# Patient Record
Sex: MALE | Race: WHITE | HISPANIC OR LATINO | Employment: OTHER | URBAN - METROPOLITAN AREA
[De-identification: names, ages, dates, MRNs, and addresses within clinical notes are randomized per-mention and may not be internally consistent; named-entity substitution may affect disease eponyms.]

---

## 2017-04-01 ENCOUNTER — HOSPITAL ENCOUNTER (EMERGENCY)
Facility: HOSPITAL | Age: 32
Discharge: HOME | End: 2017-04-01
Attending: EMERGENCY MEDICINE

## 2017-04-01 VITALS
OXYGEN SATURATION: 97 % | HEIGHT: 69 IN | DIASTOLIC BLOOD PRESSURE: 82 MMHG | SYSTOLIC BLOOD PRESSURE: 144 MMHG | BODY MASS INDEX: 37.18 KG/M2 | RESPIRATION RATE: 18 BRPM | WEIGHT: 251 LBS | HEART RATE: 85 BPM | TEMPERATURE: 98 F

## 2017-04-01 DIAGNOSIS — R10.9 FLANK PAIN: ICD-10-CM

## 2017-04-01 DIAGNOSIS — R31.9 HEMATURIA: ICD-10-CM

## 2017-04-01 DIAGNOSIS — N20.0 NEPHROLITHIASIS: Primary | ICD-10-CM

## 2017-04-01 LAB
BILIRUB UR QL STRIP: NEGATIVE
CLARITY UR: CLEAR
COLOR UR: YELLOW
GLUCOSE UR QL STRIP: NEGATIVE
HGB UR QL STRIP: ABNORMAL
KETONES UR QL STRIP: NEGATIVE
LEUKOCYTE ESTERASE UR QL STRIP: NEGATIVE
MICROSCOPIC COMMENT: ABNORMAL
NITRITE UR QL STRIP: NEGATIVE
PH UR STRIP: 6 [PH] (ref 5–8)
PROT UR QL STRIP: ABNORMAL
RBC #/AREA URNS HPF: 19 /HPF (ref 0–4)
SP GR UR STRIP: 1.02 (ref 1–1.03)
URN SPEC COLLECT METH UR: ABNORMAL
UROBILINOGEN UR STRIP-ACNC: NEGATIVE EU/DL

## 2017-04-01 PROCEDURE — 81000 URINALYSIS NONAUTO W/SCOPE: CPT

## 2017-04-01 PROCEDURE — 99284 EMERGENCY DEPT VISIT MOD MDM: CPT

## 2017-04-01 RX ORDER — TAMSULOSIN HYDROCHLORIDE 0.4 MG/1
0.4 CAPSULE ORAL DAILY
Qty: 30 CAPSULE | Refills: 0 | Status: SHIPPED | OUTPATIENT
Start: 2017-04-01 | End: 2018-04-01

## 2017-04-01 RX ORDER — NAPROXEN 375 MG/1
375 TABLET ORAL 2 TIMES DAILY WITH MEALS
Qty: 30 TABLET | Refills: 0 | Status: SHIPPED | OUTPATIENT
Start: 2017-04-01

## 2017-04-01 NOTE — DISCHARGE INSTRUCTIONS
"  Cálculo Renal [Kidney Stone W/ Colic]    Katy daya retorcijón (cólico [cramping]) que usted siente, así jim las náuseas (nausea) y el vómito (vomiting) que tiene se deben a rah pequeña gracie (cálculo [stone]) que se le formó en el riñón y que ahora está pasando por un conducto angosto (el uréter), jemima hacia la vejiga. Rah vez que llegue a la vejiga, el dolor se detendrá. Es posible que el cálculo (gracie [stone]) pase entero por el tracto urinario. [El tamaño puede ser de entre 1/16" y 1/4" (1 y 6 mm)]. También puede ser que la gracie se desgrane en fragmentos arenosos de los que usted quizás ni siquiera se dé cuenta.  Cuando se ha tenido un cálculo renal (kidney stone), hay probabilidades de que otro vuelva a formarse en el futuro.  Cuidados En [place]:  1. Maty gran cantidad de agua (al menos, entre 8 y 10 vasos al día).  2. La mayoría de los cálculos (piedras [stones]) salen solas, rosa pueden tardar desde algunas horas a algunos días en hacerlo. Hay veces en que la gracie es demasiado jackson para salir linda y, entonces, hay que recurrir a métodos especiales para quitarla.  3. Cada vez que orine, hágalo en un recipiente (jarra o algo similar). Vierta la orina de katy recipiente en el inodoro pasándola por un colador. Hágalo así hasta que hayan pasado 24 horas después de que haya cesado el dolor. Para entonces, si había un cálculo renal (gracie en el riñón [kidney stone]), ya debería briana salido de la vejiga. Algunas piedras se disuelven en partículas que parecen arena y atraviesan el colador sin que usted lo note. Si eso sucede, no verá ninguna gracie.  4. Si encuentra rah gracie en el colador, guárdela y llévesela al médico para que la analice. Hay algunos tipos de gracie cuya formación se puede prevenir. Por lo tanto, es importante saber qué tipo de gracie es la que usted tiene.  5. Intente mantenerse lo más activo posible, dado que eso ayudará a expulsar la gracie. No se quede en la cama a menos que el " dolor le impida estar levantado. Quizás note que la orina es de color rojiza, rosada o amarronada. Es normal cuando se está despidiendo un cálculo renal (kidney stone).  Programe rah VISITA DE CONTROL con gustafson médico o regrese a solis centro si el dolor dura más de 48 horas.  [NOTA: Si le buckley hecho rah radiografía (X-ray) o rah tomografía computarizada (CT scan), éstas serán evaluadas por un especialista. Le informarán de los nuevos hallazgos que puedan afectar la atención médica que necesita.]  Busque Prontamente Atención Médica  si algo de lo siguiente ocurre:  · El dolor no disminuye con el medicamento que le dieron.  · Vómito persistente o no puede mantener los líquidos en el estómago.  · Debilidad, mareo o desmayo.  · Fiebre de 100.4°F (38°C) o más ernst, o jim le haya indicado gustafson proveedor de atención médica.  · Orina opaca de color rojizo o amarronada (no se puede negrita a través de jenny) u orina que tiene muchos coágulos de bette (blood clots).  · No ha podido orinar en 8 horas y siente rah presión en la vejiga que es cada vez mayor.     Date Last Reviewed: 1/10/2014  © 2434-5208 [employer]. [address] [place] [id number]. Todos los derechos reservados. Esta información no pretende sustituir la atención médica profesional. Sólo gustafson médico puede diagnosticar y tratar un problema de kenyetta.

## 2017-04-01 NOTE — ED PROVIDER NOTES
SCRIBE #1 NOTE: I, Nicole Pickard, am scribing for, and in the presence of, Asael Marinao MD. I have scribed the entire note.      History      Chief Complaint   Patient presents with    Abdominal Pain     patient abdominal pain for 2 day and nausea in the morning.        Review of patient's allergies indicates:  No Known Allergies     HPI   HPI    4/1/2017, 1:16 PM   History obtained from the patient      History of Present Illness: Tin Mckeon is a 32 y.o. male patient who presents to the Emergency Department for abdominal pain which onset gradually 2 days ago. Symptoms are constant and moderate in severity. Pain is located to LLQ. No mitigating or exacerbating factors reported. Associated sxs include dysuria. Patient denies any hematuria, difficulty urinating, fever, HA n/v/d, constipation, flank pain, back pain, CP, SOB, and all other sxs at this time. HPI is limited due to pt speaking little english. No further complaints or concerns at this time.         Arrival mode: Personal vehicle    PCP: No primary care provider on file.       Past Medical History:  Past medical history reviewed not relevant      Past Surgical History:  Past surgical history reviewed not relevant      Family History:  Family history reviewed not relevant      Social History:  Unknown    Social History Main Topics    Social History Main Topics    Smoking status: Unknown if ever smoked    Smokeless tobacco: Unknown if ever used    Alcohol Use: Unknown drinking history    Drug Use: Unknown if ever used    Sexual Activity: Unknown         ROS   Review of Systems   Constitutional: Negative for fever.   HENT: Negative for congestion and sore throat.    Respiratory: Negative for shortness of breath.    Cardiovascular: Negative for chest pain.   Gastrointestinal: Positive for abdominal pain (LLQ). Negative for abdominal distention, blood in stool, constipation, diarrhea, nausea and vomiting.   Genitourinary: Positive for dysuria. Negative  "for decreased urine volume, difficulty urinating, flank pain and hematuria.   Musculoskeletal: Negative for back pain.   Skin: Negative for rash.   Neurological: Negative for weakness and headaches.   Hematological: Does not bruise/bleed easily.       Physical Exam    Initial Vitals   BP Pulse Resp Temp SpO2   04/01/17 1313 04/01/17 1313 04/01/17 1313 04/01/17 1313 04/01/17 1313   144/82 85 18 97.6 °F (36.4 °C) 97 %      Physical Exam  Nursing Notes and Vital Signs Reviewed.  Constitutional: Patient is in no acute distress. Awake and alert. Well-developed and well-nourished.  Head: Atraumatic. Normocephalic.  Eyes: PERRL. EOM intact. Conjunctivae are not pale. No scleral icterus.  ENT: Mucous membranes are moist. Oropharynx is clear and symmetric.    Neck: Supple. Full ROM. No lymphadenopathy.  Cardiovascular: Regular rate. Regular rhythm. No murmurs, rubs, or gallops. Distal pulses are 2+ and symmetric.  Pulmonary/Chest: No respiratory distress. Clear to auscultation bilaterally. No wheezing, rales, or rhonchi.  Abdominal: Soft and non-distended.  There is no tenderness.  No rebound, guarding, or rigidity. Good bowel sounds.  Genitourinary: No CVA tenderness  Musculoskeletal: Moves all extremities. No obvious deformities. No edema. No calf tenderness.  Skin: Warm and dry.  Neurological:  Alert, awake, and appropriate.  Normal speech.  No acute focal neurological deficits are appreciated.  Psychiatric: Normal affect. Good eye contact. Appropriate in content.    ED Course    Procedures  ED Vital Signs:  Vitals:    04/01/17 1313   BP: (!) 144/82   Pulse: 85   Resp: 18   Temp: 97.6 °F (36.4 °C)   TempSrc: Oral   SpO2: 97%   Weight: 113.9 kg (251 lb)   Height: 5' 9" (1.753 m)       Abnormal Lab Results:  Labs Reviewed   URINALYSIS - Abnormal; Notable for the following:        Result Value    Protein, UA Trace (*)     Occult Blood UA 3+ (*)     All other components within normal limits   URINALYSIS MICROSCOPIC - " Abnormal; Notable for the following:     RBC, UA 19 (*)     All other components within normal limits        All Lab Results:  Results for orders placed or performed during the hospital encounter of 04/01/17   Urinalysis   Result Value Ref Range    Specimen UA Urine, Clean Catch     Color, UA Yellow Yellow, Straw, Enriqueta    Appearance, UA Clear Clear    pH, UA 6.0 5.0 - 8.0    Specific Gravity, UA 1.025 1.005 - 1.030    Protein, UA Trace (A) Negative    Glucose, UA Negative Negative    Ketones, UA Negative Negative    Bilirubin (UA) Negative Negative    Occult Blood UA 3+ (A) Negative    Nitrite, UA Negative Negative    Urobilinogen, UA Negative <2.0 EU/dL    Leukocytes, UA Negative Negative   Urinalysis Microscopic   Result Value Ref Range    RBC, UA 19 (H) 0 - 4 /hpf    Microscopic Comment SEE COMMENT          Imaging Results:  Imaging Results         CT Renal Stone Study ABD Pelvis WO (Final result) Result time:  04/01/17 14:59:30    Final result by Leonardo Pierson Jr., MD (04/01/17 14:59:30)    Impression:     Punctate calculus at the left ureterovesical junction.  Mild left hydroureter.    All CT scans at this facility use dose modulation, iterative reconstruction, and/or weight based dosing when appropriate to reduce radiation dose to as low as reasonably achievable.      Electronically signed by: LEONARDO PIERSON MD  Date:     04/01/17  Time:    14:59     Narrative:    EXAM:   CT RENAL STONE STUDY ABD PELVIS WO    CLINICAL HISTORY:  Pain abdominal unsp. (789.00)      COMPARISON:  None    TECHNIQUE: Noncontrast axial images of the abdomen and pelvis were obtained.  No IV contrast.  No oral.  Multiplanar reconstruction images were produced.    FINDINGS:   No acute lung findings are evident.  Gallbladder bile ducts unremarkable.  Diffuse fatty liver change.  No discrete focal liver mass can be appreciated.  Spleen, pancreas, adrenal glands, aorta are unremarkable.    Right kidney: No calculi.  No  hydronephrosis.    Left kidney: Punctate calculus at the left ureterovesical junction, image 171 series 2.  Some minimal hydroureter.  No hydronephrosis.  Indication is some mild renal edema.    Urinary bladder is otherwise unremarkable.  Prostate gland is not enlarged.    No oral contrast material.  No acute intestinal findings.  Normal appendix.  No ascites.  Scattered small lymph nodes.    No acute findings.  Degenerative changes in the spine.                      The Emergency Provider reviewed the vital signs and test results, which are outlined above.    ED Discussion     3:12 PM: Reassessed pt at this time. Discussed with pt all pertinent ED information and results. Discussed pt dx and plan of tx. Gave pt all f/u and return to the ED instructions. All questions and concerns were addressed at this time. Pt expresses understanding of information and instructions, and is comfortable with plan to discharge. Pt is stable for discharge.      Pre-hypertension/Hypertension: The pt has been informed that they may have pre-hypertension or hypertension based on a blood pressure reading in the ED. I recommend that the pt call the PCP listed on their discharge instructions or a physician of their choice this week to arrange f/u for further evaluation of possible pre-hypertension or hypertension.     ED Medication(s):  Medications - No data to display    Discharge Medication List as of 4/1/2017  3:02 PM      START taking these medications    Details   naproxen (NAPROSYN) 375 MG tablet Take 1 tablet (375 mg total) by mouth 2 (two) times daily with meals., Starting 4/1/2017, Until Discontinued, Print      tamsulosin (FLOMAX) 0.4 mg Cp24 Take 1 capsule (0.4 mg total) by mouth once daily., Starting 4/1/2017, Until Sun 4/1/18, Print             Follow-up Information     Follow up with PCP In 2 days.    Contact information:    [phone]            Medical Decision Making    Medical Decision Making:   Clinical Tests:   Lab Tests:  Ordered and Reviewed  Radiological Study: Ordered and Reviewed           Scribe Attestation:   Scribe #1: I performed the above scribed service and the documentation accurately describes the services I performed. I attest to the accuracy of the note.    Attending:   Physician Attestation Statement for Scribe #1: I, Asael Mariano MD, personally performed the services described in this documentation, as scribed by Nicole Pickard, in my presence, and it is both accurate and complete.          Clinical Impression       ICD-10-CM ICD-9-CM   1. Nephrolithiasis N20.0 592.0   2. Flank pain R10.9 789.09   3. Hematuria R31.9 599.70       Disposition:   Disposition: Discharged  Condition: Stable         Asael Mariano MD  04/01/17 [id number]

## 2017-04-01 NOTE — ED AVS SNAPSHOT
[facility]  [address] [facility] [place] [address]               Tin Mckeon   2017  1:16 PM   ED    Descripción:  Male : 1985   Departamento:  [facility]            Chairez Cuidado fue coordinado por:     Provider Role From To    Asael Mariano MD Attending Provider 17 1316 --      Razón de la quynh     Abdominal Pain           Diagnósticos de Esta Visita        Comentarios    Nephrolithiasis    -  Primario       ED Disposition     ED Disposition Condition Comment    Discharge             Lista de tareas           Información de seguimiento     Realice un seguimiento con:  PCP    Cuándo:  4/3/2017    Información de contacto:    [phone]      Recetas para recoger        Disp Refills Start End    tamsulosin (FLOMAX) 0.4 mg Cp24 30 capsule 0 2017    Take 1 capsule (0.4 mg total) by mouth once daily. - Oral    naproxen (NAPROSYN) 375 MG tablet 30 tablet 0 2017     Take 1 tablet (375 mg total) by mouth 2 (two) times daily with meals. - Oral      [facility] en Llamada     [facility] de Enfermeras - Asistencia   Enfermeras registradas de [facility] pueden ayudarle a reservar rah quynh, proveer educación para la kenyetta, asesoría clínica, y otros servicios de asesoramiento.   Llame para solis servicio gratuito a [phone].             Medicamentos           Mensaje sobre Medicamentos     Verificar los cambios y / o adiciones a chairez régimen de medicación son los mismos que discutir con chairez médico. Si cualquiera de estos cambios o adiciones son incorrectos, por favor notifique a chairez proveedor de atención médica.        EMPEZAR a julianne estos medicamentos NUEVOS        Refills    tamsulosin (FLOMAX) 0.4 mg Cp24 0    Sig: Take 1 capsule (0.4 mg total) by mouth once daily.    Categoría: Print    Vía: Oral    naproxen (NAPROSYN) 375 MG tablet 0    Sig: Take 1 tablet (375 mg total) by mouth 2 (two) times daily with meals.     "Categoría: Print    Vía: Oral           Verifique que la siguiente lista de medicamentos es rah representación exacta de los medicamentos que está tomando actualmente. Si no hay ningunos reportados, la lista puede estar en heath. Si no es correcta, por favor póngase en contacto con gustafson proveedor de atención médica. Lleve esta lista con usted en soledad de emergencia.           Medicamentos Actuales     naproxen (NAPROSYN) 375 MG tablet Take 1 tablet (375 mg total) by mouth 2 (two) times daily with meals.    tamsulosin (FLOMAX) 0.4 mg Cp24 Take 1 capsule (0.4 mg total) by mouth once daily.           Información de referencia clínica           Bibi signos vitales razia     PS Pulso Temperatura Resp [place] Peso    144/82 (BP Location: Right arm, Patient Position: Sitting) 85 97.6 °F (36.4 °C) (Oral) 18 5' 9" (1.753 m) 113.9 kg (251 lb)    SpO2 BMI (IMC)                97% 37.07 kg/m2          Alergias     A partir del:  4/1/2017        No Known Allergies      Vacunas     Administradas en la fecha de la visita:  4/1/2017        None      ED Micro, Lab, POCT     Start Ordered       Status Ordering Provider    04/01/17 1331 04/01/17 1330  Urinalysis  STAT      Final result     04/01/17 1330 04/01/17 1330  Urinalysis Microscopic  Once      Final result       ED Imaging Orders     Start Ordered       Status Ordering Provider    04/01/17 1427 04/01/17 1426  CT Renal Stone Study ABD Pelvis WO  1 time imaging      Final result         Instrucciones a nabila de ernst         Cálculo Renal [Kidney Stone W/ Colic]    Anel daya retorcijón (cólico [cramping]) que usted siente, así jim las náuseas (nausea) y el vómito (vomiting) que tiene se deben a rah pequeña gracie (cálculo [stone]) que se le formó en el riñón y que ahora está pasando por un conducto angosto (el uréter), jemima hacia la vejiga. Rah vez que llegue a la vejiga, el dolor se detendrá. Es posible que el cálculo (gracie [stone]) pase entero por el tracto urinario. [El tamaño " "puede ser de entre 1/16" y 1/4" (1 y 6 mm)]. También puede ser que la gracie se desgrane en fragmentos arenosos de los que usted quizás ni siquiera se dé cuenta.  Cuando se ha tenido un cálculo renal (kidney stone), hay probabilidades de que otro vuelva a formarse en el futuro.  Cuidados En [place]:  1. Maty gran cantidad de agua (al menos, entre 8 y 10 vasos al día).  2. La mayoría de los cálculos (piedras [stones]) salen solas, rosa pueden tardar desde algunas horas a algunos días en hacerlo. Hay veces en que la gracie es demasiado jackson para salir linda y, entonces, hay que recurrir a métodos especiales para quitarla.  3. Cada vez que orine, hágalo en un recipiente (jarra o algo similar). Vierta la orina de katy recipiente en el inodoro pasándola por un colador. Hágalo así hasta que hayan pasado 24 horas después de que haya cesado el dolor. Para entonces, si había un cálculo renal (gracie en el riñón [kidney stone]), ya debería briana salido de la vejiga. Algunas piedras se disuelven en partículas que parecen arena y atraviesan el colador sin que usted lo note. Si eso sucede, no verá ninguna gracie.  4. Si encuentra rah gracie en el colador, guárdela y llévesela al médico para que la analice. Hay algunos tipos de gracie cuya formación se puede prevenir. Por lo tanto, es importante saber qué tipo de gracie es la que usted tiene.  5. Intente mantenerse lo más activo posible, dado que eso ayudará a expulsar la gracie. No se quede en la cama a menos que el dolor le impida estar levantado. Quizás note que la orina es de color rojiza, rosada o amarronada. Es normal cuando se está despidiendo un cálculo renal (kidney stone).  Programe rah VISITA DE CONTROL con gustafson médico o regrese a solis centro si el dolor dura más de 48 horas.  [NOTA: Si le buckley hecho rah radiografía (X-ray) o rah tomografía computarizada (CT scan), éstas serán evaluadas por un especialista. Le informarán de los nuevos hallazgos que puedan afectar la " atención médica que necesita.]  Busque Prontamente Atención Médica  si algo de lo siguiente ocurre:  · El dolor no disminuye con el medicamento que le dieron.  · Vómito persistente o no puede mantener los líquidos en el estómago.  · Debilidad, mareo o desmayo.  · Fiebre de 100.4°F (38°C) o más ernst, o jim le haya indicado gustafson proveedor de atención médica.  · Orina opaca de color rojizo o amarronada (no se puede negrita a través de jenny) u orina que tiene muchos coágulos de bette (blood clots).  · No ha podido orinar en 8 horas y siente rah presión en la vejiga que es cada vez mayor.     Date Last Reviewed: 1/10/2014  © 9660-3696 [employer]. [address] [place] [address]. Todos los derechos reservados. Esta información no pretende sustituir la atención médica profesional. Sólo gustafson médico puede diagnosticar y tratar un problema de kenyetta.          Smoking Cessation     Si desea dejar de fumar:  · Usted puede ser elegible para recibir servicios gratuitos si usted es un residente de [place] y comenzó a fumar cigarrillos antes del 1 de septiembre de 1988. Llame al Smoking Cessation Trust (SCT) a [phone] o [phone].   Llame 1-800-QUIT-NOW si usted no cumple con los criterios anteriores.             [facility]  cumple con las leyes federales aplicables de derechos civiles y no discrimina por motivos de jose, color, origen nacional, edad, discapacidad, o sexo.        Language Assistance Services     ATTENTION: Language assistance services are available, free of charge. Please call [phone].      ATENCIÓN: Si habla español, tiene a gustafson disposición servicios gratuitos de asistencia lingüística. Nilda al [phone].     HOLLY Ý: N?u b?n nói Ti?ng Vi?t, có các d?ch v? h? tr? ngôn ng? mi?n phí dành cho b?n. G?i s? [phone].                      [facility]  [address] [facility] [address]               Tin Mckeon    2017  1:16 PM   ED    Description:  Male : 1985   Department:  [facility]            Your Care was Coordinated By:     Provider Role From To    Asael Mariano MD Attending Provider 17 1468 --      Reason for Visit     Abdominal Pain           Diagnoses this Visit        Comments    Nephrolithiasis    -  Primary       ED Disposition     ED Disposition Condition Comment    Discharge             To Do List           Follow-up Information     Follow up with PCP In 2 days.    Contact information:    [phone]       These Medications        Disp Refills Start End    tamsulosin (FLOMAX) 0.4 mg Cp24 30 capsule 0 2017    Take 1 capsule (0.4 mg total) by mouth once daily. - Oral    naproxen (NAPROSYN) 375 MG tablet 30 tablet 0 2017     Take 1 tablet (375 mg total) by mouth 2 (two) times daily with meals. - Oral      [facility]     [facility] -  Assistance  Unless otherwise directed by your provider, please contact [facility], our nurse care line that is available for  assistance.     Registered nurses in the [facility] provide: appointment scheduling, clinical advisement, health education, and other advisory services.  Call: [phone] (toll free)               Medications           Message regarding Medications     Verify the changes and/or additions to your medication regime listed below are the same as discussed with your clinician today.  If any of these changes or additions are incorrect, please notify your healthcare provider.        START taking these NEW medications        Refills    tamsulosin (FLOMAX) 0.4 mg Cp24 0    Sig: Take 1 capsule (0.4 mg total) by mouth once daily.    Class: Print    Route: Oral    naproxen (NAPROSYN) 375 MG tablet 0    Sig: Take 1 tablet (375 mg total) by mouth 2 (two) times daily with meals.    Class: Print    Route: Oral           Verify that the below list of medications  "is an accurate representation of the medications you are currently taking.  If none reported, the list may be blank. If incorrect, please contact your healthcare provider. Carry this list with you in case of emergency.           Current Medications     naproxen (NAPROSYN) 375 MG tablet Take 1 tablet (375 mg total) by mouth 2 (two) times daily with meals.    tamsulosin (FLOMAX) 0.4 mg Cp24 Take 1 capsule (0.4 mg total) by mouth once daily.           Clinical Reference Information           Your Vitals Were     BP Pulse Temp Resp Height Weight    144/82 (BP Location: Right arm, Patient Position: Sitting) 85 97.6 °F (36.4 °C) (Oral) 18 5' 9" (1.753 m) 113.9 kg (251 lb)    SpO2 BMI                97% 37.07 kg/m2          Allergies as of 4/1/2017     No Known Allergies      Immunizations Administered on Date of Encounter - 4/1/2017     None      ED Micro, Lab, POCT     Start Ordered       Status Ordering Provider    04/01/17 1331 04/01/17 1330  Urinalysis  STAT      Final result     04/01/17 1330 04/01/17 1330  Urinalysis Microscopic  Once      Final result       ED Imaging Orders     Start Ordered       Status Ordering Provider    04/01/17 1427 04/01/17 1426  CT Renal Stone Study ABD Pelvis WO  1 time imaging      Final result         Discharge Instructions         Cálculo Renal [Kidney Stone W/ Colic]    Anel daya retorcijón (cólico [cramping]) que usted siente, así jim las náuseas (nausea) y el vómito (vomiting) que tiene se deben a rah pequeña gracie (cálculo [stone]) que se le formó en el riñón y que ahora está pasando por un conducto angosto (el uréter), jemima hacia la vejiga. Rah vez que llegue a la vejiga, el dolor se detendrá. Es posible que el cálculo (gracie [stone]) pase entero por el tracto urinario. [El tamaño puede ser de entre 1/16" y 1/4" (1 y 6 mm)]. También puede ser que la gracie se desgrane en fragmentos arenosos de los que usted quizás ni siquiera se dé cuenta.  Cuando se ha tenido un cálculo " renal (kidney stone), hay probabilidades de que otro vuelva a formarse en el futuro.  Cuidados En [place]:  1. Maty gran cantidad de agua (al menos, entre 8 y 10 vasos al día).  2. La mayoría de los cálculos (piedras [stones]) salen solas, rosa pueden tardar desde algunas horas a algunos días en hacerlo. Hay veces en que la gracie es demasiado jackson para salir linda y, entonces, hay que recurrir a métodos especiales para quitarla.  3. Cada vez que orine, hágalo en un recipiente (jarra o algo similar). Vierta la orina de katy recipiente en el inodoro pasándola por un colador. Hágalo así hasta que hayan pasado 24 horas después de que haya cesado el dolor. Para entonces, si había un cálculo renal (gracie en el riñón [kidney stone]), ya debería briana salido de la vejiga. Algunas piedras se disuelven en partículas que parecen arena y atraviesan el colador sin que usted lo note. Si eso sucede, no verá ninguna gracie.  4. Si encuentra rah gracie en el colador, guárdela y llévesela al médico para que la analice. Hay algunos tipos de gracie cuya formación se puede prevenir. Por lo tanto, es importante saber qué tipo de gracie es la que usted tiene.  5. Intente mantenerse lo más activo posible, dado que eso ayudará a expulsar la gracie. No se quede en la cama a menos que el dolor le impida estar levantado. Quizás note que la orina es de color rojiza, rosada o amarronada. Es normal cuando se está despidiendo un cálculo renal (kidney stone).  Programe rah VISITA DE CONTROL con gustafson médico o regrese a solis centro si el dolor dura más de 48 horas.  [NOTA: Si le buckley hecho rah radiografía (X-ray) o rah tomografía computarizada (CT scan), éstas serán evaluadas por un especialista. Le informarán de los nuevos hallazgos que puedan afectar la atención médica que necesita.]  Busque Prontamente Atención Médica  si algo de lo siguiente ocurre:  · El dolor no disminuye con el medicamento que le dieron.  · Vómito persistente o no puede  mantener los líquidos en el estómago.  · Debilidad, mareo o desmayo.  · Fiebre de 100.4°F (38°C) o más ernst, o jim le haya indicado gustafson proveedor de atención médica.  · Orina opaca de color rojizo o amarronada (no se puede negrita a través de jenny) u orina que tiene muchos coágulos de bette (blood clots).  · No ha podido orinar en 8 horas y siente rah presión en la vejiga que es cada vez mayor.     Date Last Reviewed: 1/10/2014  © 8364-2640 [employer]. [address] [place] [address], [place] [address]. Todos los derechos reservados. Esta información no pretende sustituir la atención médica profesional. Sólo gustafson médico puede diagnosticar y tratar un problema de kenyetta.          Smoking Cessation     If you would like to quit smoking:   You may be eligible for free services if you are a [place] resident and started smoking cigarettes before September 1, 1988.  Call the Smoking Cessation Trust ([facility]) toll free at (014) 163-6361 or (217) 177-4388.   Call 1-800-QUIT-NOW if you do not meet the above criteria.   Contact us via email: [email or web address]   View our website for more information: [email or web address]         [facility]  complies with applicable Federal civil rights laws and does not discriminate on the basis of race, color, national origin, age, disability, or sex.        Language Assistance Services     ATTENTION: Language assistance services are available, free of charge. Please call [phone].      ATENCIÓN: Si habla español, tiene a gustafson disposición servicios gratuitos de asistencia lingüística. Llame al [phone].     CHÚ Ý: N?u b?n nói Ti?ng Vi?t, có các d?ch v? h? tr? ngôn ng? mi?n phí dành cho b?n. G?i s? [phone].

## 2020-11-27 ENCOUNTER — HOSPITAL ENCOUNTER (INPATIENT)
Facility: HOSPITAL | Age: 35
LOS: 1 days | Discharge: HOME | DRG: 872 | End: 2020-11-29
Attending: FAMILY MEDICINE | Admitting: INTERNAL MEDICINE

## 2020-11-27 DIAGNOSIS — N45.1 EPIDIDYMITIS: ICD-10-CM

## 2020-11-27 DIAGNOSIS — N50.89 TESTICULAR SWELLING: ICD-10-CM

## 2020-11-27 DIAGNOSIS — R00.0 TACHYCARDIA: ICD-10-CM

## 2020-11-27 DIAGNOSIS — A41.9 SEPSIS, DUE TO UNSPECIFIED ORGANISM, UNSPECIFIED WHETHER ACUTE ORGAN DYSFUNCTION PRESENT: Primary | ICD-10-CM

## 2020-11-27 LAB
ALBUMIN SERPL BCP-MCNC: 3.6 G/DL (ref 3.5–5.2)
ALP SERPL-CCNC: 97 U/L (ref 55–135)
ALT SERPL W/O P-5'-P-CCNC: 17 U/L (ref 10–44)
ANION GAP SERPL CALC-SCNC: 11 MMOL/L (ref 8–16)
AST SERPL-CCNC: 14 U/L (ref 10–40)
BASOPHILS # BLD AUTO: 0.03 K/UL (ref 0–0.2)
BASOPHILS NFR BLD: 0.2 % (ref 0–1.9)
BILIRUB SERPL-MCNC: 0.6 MG/DL (ref 0.1–1)
BILIRUB UR QL STRIP: NEGATIVE
BUN SERPL-MCNC: 14 MG/DL (ref 6–20)
CALCIUM SERPL-MCNC: 10.1 MG/DL (ref 8.7–10.5)
CHLORIDE SERPL-SCNC: 97 MMOL/L (ref 95–110)
CLARITY UR: CLEAR
CO2 SERPL-SCNC: 26 MMOL/L (ref 23–29)
COLOR UR: YELLOW
CREAT SERPL-MCNC: 0.8 MG/DL (ref 0.5–1.4)
DIFFERENTIAL METHOD: ABNORMAL
EOSINOPHIL # BLD AUTO: 0 K/UL (ref 0–0.5)
EOSINOPHIL NFR BLD: 0 % (ref 0–8)
ERYTHROCYTE [DISTWIDTH] IN BLOOD BY AUTOMATED COUNT: 13.9 % (ref 11.5–14.5)
EST. GFR  (AFRICAN AMERICAN): >60 ML/MIN/1.73 M^2
EST. GFR  (NON AFRICAN AMERICAN): >60 ML/MIN/1.73 M^2
GLUCOSE SERPL-MCNC: 109 MG/DL (ref 70–110)
GLUCOSE UR QL STRIP: NEGATIVE
HCT VFR BLD AUTO: 41.7 % (ref 40–54)
HCV AB SERPL QL IA: NEGATIVE
HGB BLD-MCNC: 13.7 G/DL (ref 14–18)
HGB UR QL STRIP: NEGATIVE
HIV 1+2 AB+HIV1 P24 AG SERPL QL IA: NEGATIVE
IMM GRANULOCYTES # BLD AUTO: 0.07 K/UL (ref 0–0.04)
IMM GRANULOCYTES NFR BLD AUTO: 0.5 % (ref 0–0.5)
INFLUENZA A, MOLECULAR: NEGATIVE
INFLUENZA B, MOLECULAR: NEGATIVE
KETONES UR QL STRIP: NEGATIVE
LACTATE SERPL-SCNC: 1.4 MMOL/L (ref 0.5–2.2)
LEUKOCYTE ESTERASE UR QL STRIP: NEGATIVE
LIPASE SERPL-CCNC: 12 U/L (ref 4–60)
LYMPHOCYTES # BLD AUTO: 0.6 K/UL (ref 1–4.8)
LYMPHOCYTES NFR BLD: 4.5 % (ref 18–48)
MCH RBC QN AUTO: 27.1 PG (ref 27–31)
MCHC RBC AUTO-ENTMCNC: 32.9 G/DL (ref 32–36)
MCV RBC AUTO: 83 FL (ref 82–98)
MONOCYTES # BLD AUTO: 0.8 K/UL (ref 0.3–1)
MONOCYTES NFR BLD: 5.9 % (ref 4–15)
NEUTROPHILS # BLD AUTO: 12.1 K/UL (ref 1.8–7.7)
NEUTROPHILS NFR BLD: 88.9 % (ref 38–73)
NITRITE UR QL STRIP: NEGATIVE
NRBC BLD-RTO: 0 /100 WBC
PH UR STRIP: >8 [PH] (ref 5–8)
PLATELET # BLD AUTO: 269 K/UL (ref 150–350)
PMV BLD AUTO: 9.9 FL (ref 9.2–12.9)
POTASSIUM SERPL-SCNC: 3.8 MMOL/L (ref 3.5–5.1)
PROCALCITONIN SERPL IA-MCNC: 0.24 NG/ML
PROT SERPL-MCNC: 8.5 G/DL (ref 6–8.4)
PROT UR QL STRIP: NEGATIVE
RBC # BLD AUTO: 5.05 M/UL (ref 4.6–6.2)
SARS-COV-2 RDRP RESP QL NAA+PROBE: NEGATIVE
SODIUM SERPL-SCNC: 134 MMOL/L (ref 136–145)
SP GR UR STRIP: 1.01 (ref 1–1.03)
SPECIMEN SOURCE: NORMAL
URN SPEC COLLECT METH UR: ABNORMAL
UROBILINOGEN UR STRIP-ACNC: NEGATIVE EU/DL
WBC # BLD AUTO: 13.59 K/UL (ref 3.9–12.7)

## 2020-11-27 PROCEDURE — U0002 COVID-19 LAB TEST NON-CDC: HCPCS

## 2020-11-27 PROCEDURE — G0378 HOSPITAL OBSERVATION PER HR: HCPCS

## 2020-11-27 PROCEDURE — 80053 COMPREHEN METABOLIC PANEL: CPT

## 2020-11-27 PROCEDURE — 25000003 PHARM REV CODE 250: Performed by: FAMILY MEDICINE

## 2020-11-27 PROCEDURE — 93005 ELECTROCARDIOGRAM TRACING: CPT

## 2020-11-27 PROCEDURE — 96365 THER/PROPH/DIAG IV INF INIT: CPT

## 2020-11-27 PROCEDURE — 96361 HYDRATE IV INFUSION ADD-ON: CPT

## 2020-11-27 PROCEDURE — 84145 PROCALCITONIN (PCT): CPT

## 2020-11-27 PROCEDURE — 86703 HIV-1/HIV-2 1 RESULT ANTBDY: CPT

## 2020-11-27 PROCEDURE — 99291 CRITICAL CARE FIRST HOUR: CPT | Mod: 25

## 2020-11-27 PROCEDURE — 63600175 PHARM REV CODE 636 W HCPCS: Performed by: FAMILY MEDICINE

## 2020-11-27 PROCEDURE — 93010 EKG 12-LEAD: ICD-10-PCS | Mod: ,,, | Performed by: INTERNAL MEDICINE

## 2020-11-27 PROCEDURE — 83605 ASSAY OF LACTIC ACID: CPT

## 2020-11-27 PROCEDURE — 85025 COMPLETE CBC W/AUTO DIFF WBC: CPT

## 2020-11-27 PROCEDURE — 81003 URINALYSIS AUTO W/O SCOPE: CPT

## 2020-11-27 PROCEDURE — 87040 BLOOD CULTURE FOR BACTERIA: CPT

## 2020-11-27 PROCEDURE — 86803 HEPATITIS C AB TEST: CPT

## 2020-11-27 PROCEDURE — 87502 INFLUENZA DNA AMP PROBE: CPT

## 2020-11-27 PROCEDURE — 93010 ELECTROCARDIOGRAM REPORT: CPT | Mod: ,,, | Performed by: INTERNAL MEDICINE

## 2020-11-27 PROCEDURE — 83690 ASSAY OF LIPASE: CPT

## 2020-11-27 RX ORDER — DOXYCYCLINE 100 MG/1
100 CAPSULE ORAL 2 TIMES DAILY
Qty: 28 CAPSULE | Refills: 0 | Status: SHIPPED | OUTPATIENT
Start: 2020-11-27 | End: 2020-11-29 | Stop reason: HOSPADM

## 2020-11-27 RX ORDER — HYDROCODONE BITARTRATE AND ACETAMINOPHEN 5; 325 MG/1; MG/1
1 TABLET ORAL
Status: COMPLETED | OUTPATIENT
Start: 2020-11-27 | End: 2020-11-27

## 2020-11-27 RX ORDER — CEFTRIAXONE 500 MG/1
500 INJECTION, POWDER, FOR SOLUTION INTRAMUSCULAR; INTRAVENOUS
Status: DISCONTINUED | OUTPATIENT
Start: 2020-11-27 | End: 2020-11-28

## 2020-11-27 RX ORDER — ACETAMINOPHEN 325 MG/1
650 TABLET ORAL
Status: COMPLETED | OUTPATIENT
Start: 2020-11-27 | End: 2020-11-27

## 2020-11-27 RX ADMIN — PIPERACILLIN AND TAZOBACTAM 4.5 G: 4; .5 INJECTION, POWDER, LYOPHILIZED, FOR SOLUTION INTRAVENOUS; PARENTERAL at 07:11

## 2020-11-27 RX ADMIN — SODIUM CHLORIDE, SODIUM LACTATE, POTASSIUM CHLORIDE, AND CALCIUM CHLORIDE 3345 ML: .6; .31; .03; .02 INJECTION, SOLUTION INTRAVENOUS at 05:11

## 2020-11-27 RX ADMIN — HYDROCODONE BITARTRATE AND ACETAMINOPHEN 1 TABLET: 5; 325 TABLET ORAL at 10:11

## 2020-11-27 RX ADMIN — ACETAMINOPHEN 650 MG: 325 TABLET ORAL at 05:11

## 2020-11-27 NOTE — ED PROVIDER NOTES
SCRIBE #1 NOTE: I, Junie Rodriguez, am scribing for, and in the presence of, Taniya Martin MD. I have scribed the entire note.       History     Chief Complaint   Patient presents with    Fever     States fever began today    Testicle Pain     seen here previously for same.  states bilateral pain     Review of patient's allergies indicates:  No Known Allergies      History of Present Illness     HPI    11/27/2020, 5:27 PM  History obtained from the patient via translater      History of Present Illness: Tin Mckeon is a 35 y.o. male patient who presents to the Emergency Department for evaluation of bilateral testicle pain which onset gradually today. Symptoms are constant and moderate in severity. No mitigating or exacerbating factors reported. Associated sxs include fever (Tnow 102.7 F), lower abdominal pain, and bilateral lower back pain. Patient denies any diaphoresis, chills, n/v/d, dysuria, hematuria, frequency, urgency, dizziness, lightheadedness, and all other sxs at this time. Denies any new sexual partners and reports wearing condoms. Patient reports having a similar episode 2 weeks ago and was seen here in ED. Reports being prescribed medication but is unsure what kind. Prior tx include Aleve. No further complaints or concerns at this time.       Arrival mode: Personal vehicle    PCP: No primary care provider on file.        Past Medical History:  No past medical history on file.    Past Surgical History:  No past surgical history on file.      Family History:  No family history on file.    Social History:  Social History     Tobacco Use    Smoking status: Not on file   Substance and Sexual Activity    Alcohol use: Not on file    Drug use: Not on file    Sexual activity: Not on file        Review of Systems     Review of Systems   Constitutional: Positive for fever. Negative for activity change, chills and diaphoresis.   HENT: Negative for congestion, rhinorrhea, sneezing, sore throat and trouble  swallowing.    Eyes: Negative for pain.   Respiratory: Negative for cough, chest tightness, shortness of breath, wheezing and stridor.    Cardiovascular: Negative for chest pain, palpitations and leg swelling.   Gastrointestinal: Positive for abdominal pain (lower). Negative for abdominal distention, constipation, diarrhea, nausea and vomiting.   Genitourinary: Positive for testicular pain. Negative for difficulty urinating, dysuria, frequency, penile pain and urgency.   Musculoskeletal: Positive for back pain (lower). Negative for arthralgias, myalgias, neck pain and neck stiffness.   Skin: Negative for pallor, rash and wound.   Neurological: Negative for dizziness, syncope, weakness, light-headedness, numbness and headaches.   Hematological: Does not bruise/bleed easily.   All other systems reviewed and are negative.     Physical Exam     Initial Vitals [11/27/20 1648]   BP Pulse Resp Temp SpO2   122/70 (!) 122 20 (!) 102.7 °F (39.3 °C) (!) 94 %      MAP       --          Physical Exam  Nursing Notes and Vital Signs Reviewed.  Constitutional: Patient is in no acute distress. Well-developed and well-nourished.  Head: Atraumatic. Normocephalic.  Eyes: PERRL. EOM intact. Conjunctivae are not pale. No scleral icterus.  ENT: Mucous membranes are moist. Oropharynx is clear and symmetric.    Neck: Supple. Full ROM. No lymphadenopathy.  Cardiovascular: Tachycardic. Regular rhythm. No murmurs, rubs, or gallops. Distal pulses are 2+ and symmetric.  Pulmonary/Chest: No respiratory distress. Clear to auscultation bilaterally. No wheezing or rales.  Abdominal: Soft and non-distended.  There is suprapubic tenderness.  No rebound, guarding, or rigidity. Good bowel sounds.  Genitourinary: Scrotum is erythematous and warm to touch. Testicles are distended and tender to palpation.   Musculoskeletal: Moves all extremities. No obvious deformities. No edema. No calf tenderness.  Skin: Warm and dry.  Neurological:  Alert, awake, and  appropriate.  Normal speech.  No acute focal neurological deficits are appreciated.  Psychiatric: Normal affect. Good eye contact. Appropriate in content.     ED Course   Critical Care    Date/Time: 11/27/2020 10:10 PM  Performed by: Taniya Martin MD  Authorized by: Taniya Martin MD   Direct patient critical care time: 15 minutes  Additional history critical care time: 7 minutes  Ordering / reviewing critical care time: 12 minutes  Documentation critical care time: 6 minutes  Consulting other physicians critical care time: 5 minutes  Consult with family critical care time: 0 minutes  Other critical care time: 0 minutes  Total critical care time (exclusive of procedural time) : 45 minutes  Critical care time was exclusive of separately billable procedures and treating other patients and teaching time.  Critical care was necessary to treat or prevent imminent or life-threatening deterioration of the following conditions: sepsis.  Critical care was time spent personally by me on the following activities: blood draw for specimens, development of treatment plan with patient or surrogate, discussions with consultants, interpretation of cardiac output measurements, evaluation of patient's response to treatment, examination of patient, obtaining history from patient or surrogate, ordering and performing treatments and interventions, ordering and review of laboratory studies, ordering and review of radiographic studies, pulse oximetry, re-evaluation of patient's condition and review of old charts.        ED Vital Signs:  Vitals:    11/27/20 1648 11/27/20 1701 11/27/20 1707 11/27/20 1715   BP: 122/70  (!) 129/58    Pulse: (!) 122  110 109   Resp: 20      Temp: (!) 102.7 °F (39.3 °C)      TempSrc: Oral      SpO2: (!) 94%  100%    Weight:  111.5 kg (245 lb 13 oz)      11/27/20 1911 11/27/20 2140 11/27/20 2141 11/27/20 2221   BP: 124/68  132/63 (!) 128/56   Pulse: (!) 114  100 106   Resp: 20  18 20   Temp: 98.9 °F (37.2 °C)  98.6 °F (37 °C)     TempSrc: Oral Oral     SpO2: 98%  100% 100%   Weight:        11/27/20 2230 11/27/20 2232 11/27/20 2300 11/27/20 2330   BP: (!) 149/70  132/62 (!) 129/58   Pulse: 103  (!) 113 (!) 111   Resp: 20 20 20 20   Temp:       TempSrc:       SpO2: 100%  95% 96%   Weight:           Abnormal Lab Results:  Labs Reviewed   CBC W/ AUTO DIFFERENTIAL - Abnormal; Notable for the following components:       Result Value    WBC 13.59 (*)     Hemoglobin 13.7 (*)     Gran # (ANC) 12.1 (*)     Immature Grans (Abs) 0.07 (*)     Lymph # 0.6 (*)     Gran % 88.9 (*)     Lymph % 4.5 (*)     All other components within normal limits   COMPREHENSIVE METABOLIC PANEL - Abnormal; Notable for the following components:    Sodium 134 (*)     Total Protein 8.5 (*)     All other components within normal limits   URINALYSIS, REFLEX TO URINE CULTURE - Abnormal; Notable for the following components:    pH, UA >8.0 (*)     All other components within normal limits    Narrative:     Specimen Source->Urine   INFLUENZA A & B BY MOLECULAR   CULTURE, BLOOD   CULTURE, BLOOD   HIV 1 / 2 ANTIBODY   HEPATITIS C ANTIBODY   LACTIC ACID, PLASMA   LIPASE   PROCALCITONIN   SARS-COV-2 RNA AMPLIFICATION, QUAL        All Lab Results:  Results for orders placed or performed during the hospital encounter of 11/27/20   Influenza A & B by Molecular    Specimen: Nasopharyngeal Swab   Result Value Ref Range    Influenza A, Molecular Negative Negative    Influenza B, Molecular Negative Negative    Flu A & B Source Nasal swab    HIV 1/2 Ag/Ab (4th Gen)   Result Value Ref Range    HIV 1/2 Ag/Ab Negative Negative   Hepatitis C antibody   Result Value Ref Range    Hepatitis C Ab Negative Negative   CBC auto differential   Result Value Ref Range    WBC 13.59 (H) 3.90 - 12.70 K/uL    RBC 5.05 4.60 - 6.20 M/uL    Hemoglobin 13.7 (L) 14.0 - 18.0 g/dL    Hematocrit 41.7 40.0 - 54.0 %    MCV 83 82 - 98 fL    MCH 27.1 27.0 - 31.0 pg    MCHC 32.9 32.0 - 36.0 g/dL    RDW  13.9 11.5 - 14.5 %    Platelets 269 150 - 350 K/uL    MPV 9.9 9.2 - 12.9 fL    Immature Granulocytes 0.5 0.0 - 0.5 %    Gran # (ANC) 12.1 (H) 1.8 - 7.7 K/uL    Immature Grans (Abs) 0.07 (H) 0.00 - 0.04 K/uL    Lymph # 0.6 (L) 1.0 - 4.8 K/uL    Mono # 0.8 0.3 - 1.0 K/uL    Eos # 0.0 0.0 - 0.5 K/uL    Baso # 0.03 0.00 - 0.20 K/uL    nRBC 0 0 /100 WBC    Gran % 88.9 (H) 38.0 - 73.0 %    Lymph % 4.5 (L) 18.0 - 48.0 %    Mono % 5.9 4.0 - 15.0 %    Eosinophil % 0.0 0.0 - 8.0 %    Basophil % 0.2 0.0 - 1.9 %    Differential Method Automated    Comprehensive metabolic panel   Result Value Ref Range    Sodium 134 (L) 136 - 145 mmol/L    Potassium 3.8 3.5 - 5.1 mmol/L    Chloride 97 95 - 110 mmol/L    CO2 26 23 - 29 mmol/L    Glucose 109 70 - 110 mg/dL    BUN 14 6 - 20 mg/dL    Creatinine 0.8 0.5 - 1.4 mg/dL    Calcium 10.1 8.7 - 10.5 mg/dL    Total Protein 8.5 (H) 6.0 - 8.4 g/dL    Albumin 3.6 3.5 - 5.2 g/dL    Total Bilirubin 0.6 0.1 - 1.0 mg/dL    Alkaline Phosphatase 97 55 - 135 U/L    AST 14 10 - 40 U/L    ALT 17 10 - 44 U/L    Anion Gap 11 8 - 16 mmol/L    eGFR if African American >60 >60 mL/min/1.73 m^2    eGFR if non African American >60 >60 mL/min/1.73 m^2   Lactic acid, plasma #1   Result Value Ref Range    Lactate (Lactic Acid) 1.4 0.5 - 2.2 mmol/L   Urinalysis, Reflex to Urine Culture Urine, Clean Catch    Specimen: Urine   Result Value Ref Range    Specimen UA Urine, Catheterized     Color, UA Yellow Yellow, Straw, Enriqueta    Appearance, UA Clear Clear    pH, UA >8.0 (A) 5.0 - 8.0    Specific Gravity, UA 1.015 1.005 - 1.030    Protein, UA Negative Negative    Glucose, UA Negative Negative    Ketones, UA Negative Negative    Bilirubin (UA) Negative Negative    Occult Blood UA Negative Negative    Nitrite, UA Negative Negative    Urobilinogen, UA Negative <2.0 EU/dL    Leukocytes, UA Negative Negative   Lipase   Result Value Ref Range    Lipase 12 4 - 60 U/L   Procalcitonin   Result Value Ref Range    Procalcitonin  0.24 <0.25 ng/mL   COVID-19 Rapid Screening   Result Value Ref Range    SARS-CoV-2 RNA, Amplification, Qual Negative Negative         Imaging Results:  Imaging Results          X-Ray Chest AP Portable (Final result)  Result time 11/27/20 19:02:53    Final result by Kenny Alves MD (11/27/20 19:02:53)                 Impression:      No acute abnormality.      Electronically signed by: Long Cox  Date:    11/27/2020  Time:    19:02             Narrative:    EXAMINATION:  XR CHEST AP PORTABLE    CLINICAL HISTORY:  Sepsis;    TECHNIQUE:  Single frontal view of the chest was performed.    COMPARISON:  None    FINDINGS:  The lungs are clear, with normal appearance of pulmonary vasculature and no pleural effusion or pneumothorax.    The cardiac silhouette is normal in size. The hilar and mediastinal contours are unremarkable.    Bones are intact.                               US Scrotum And Testicles (Final result)  Result time 11/27/20 18:29:02    Final result by Kenny Alves MD (11/27/20 18:29:02)                 Impression:      Bilateral varicoceles    Increased vascularity in the bilateral testicles and epididymis consistent with orchitis and epididymitis    Mild hydrocele bilaterally with septations or complexity on the left      Electronically signed by: Long Cox  Date:    11/27/2020  Time:    18:29             Narrative:    EXAMINATION:  US SCROTUM AND TESTICLES    CLINICAL HISTORY:  Other specified disorders of the male genital organs    TECHNIQUE:  Sonography of the scrotum and testes.    COMPARISON:  None.    FINDINGS:  Right Testicle:    *Size: 4.1 x 2 x 2.5 cm  *Appearance: Normal.  *Flow: Increased arterial and venous flow  *Epididymis: Increased vascularity  *Hydrocele: Present  *Varicocele: Present.  .    Left Testicle:    *Size: 5.2 x 3.4 x 3.6 cm  *Appearance: Normal.  *Flow: Increased arterial and venous flow  *Epididymis: Increased vascularity  *Hydrocele: Mild with  "septations  *Varicocele: Present  .    Other findings: None.                                 The EKG was ordered, reviewed, and independently interpreted by the ED provider.  Interpretation time: 17:03  Rate: 115 BPM  Rhythm: sinus tachycardia  Interpretation: No acute ST changes. No STEMI.           The Emergency Provider reviewed the vital signs and test results, which are outlined above.     ED Discussion     10:11 PM: Discussed pt's case with Dr. Shaikh (urology) who states "So pretty much every ganesh with epididymitis gets these reactive hydroceles with septations, they resolve almost always without intervention. If he stays, Im happy to see him in the AM."    11:52 PM: Discovered different chart in patient's name. He was seen two weeks ago and treated outpatient with doxycycline. Patient has failed outpatient treatment and must be admitted.    11:54 PM: Discussed case with Poornima Huddleston NP (Hospital Medicine). Dr. Dhaliwal agrees with current care and management of pt and accepts admission.   Admitting Service: hospital medicine  Admitting Physician: Dr. Dhaliwal  Admit to: obs med surg    11:55 PM: Re-evaluated pt. I have discussed test results, shared treatment plan, and the need for admission with patient and family at bedside. Pt and family express understanding at this time and agree with all information. All questions answered. Pt and family have no further questions or concerns at this time. Pt is ready for admit.         Medical Decision Making:   Clinical Tests:   Lab Tests: Ordered and Reviewed  Radiological Study: Ordered and Reviewed  Medical Tests: Ordered and Reviewed           ED Medication(s):  Medications   cefTRIAXone injection 500 mg (has no administration in time range)   lactated ringers bolus 3,345 mL (0 mL/kg × 111.5 kg Intravenous Stopped 11/27/20 1932)   acetaminophen tablet 650 mg (650 mg Oral Given 11/27/20 1732)   piperacillin-tazobactam 4.5 g in dextrose 5 % 100 mL IVPB (ready to mix " "system) (0 g Intravenous Stopped 11/27/20 1942)   HYDROcodone-acetaminophen 5-325 mg per tablet 1 tablet (1 tablet Oral Given 11/27/20 2232)       New Prescriptions    DOXYCYCLINE (VIBRAMYCIN) 100 MG CAP    Take 1 capsule (100 mg total) by mouth 2 (two) times daily. for 14 days       Follow-up Information     [facility]. Schedule an appointment as soon as possible for a visit in 3 days.    Contact information:  [address] [facility] [address] [phone]                       Scribe Attestation:   Scribe #1: I performed the above scribed service and the documentation accurately describes the services I performed. I attest to the accuracy of the note.     Attending:   Physician Attestation Statement for Scribe #1: I, Taniya Martin MD, personally performed the services described in this documentation, as scribed by Junie Rodriguez, in my presence, and it is both accurate and complete.           Clinical Impression       ICD-10-CM ICD-9-CM   1. Sepsis, due to unspecified organism, unspecified whether acute organ dysfunction present  A41.9 038.9     995.91   2. Tachycardia  R00.0 785.0   3. Testicular swelling  N50.89 608.86   4. Epididymitis  N45.1 604.90       Disposition:   Disposition: Placed in Observation  Condition: Fair    Portions of this note may have been created with voice recognition software. Occasional "wrong-word" or "sound-a-like" substitutions may have occurred due to the inherent limitations of voice recognition software. Please, read the note carefully and recognize, using context, where substitutions have occurred.          Taniya Martin MD  11/29/20 0636    "

## 2020-11-28 PROBLEM — N45.3 ORCHITIS AND EPIDIDYMITIS: Status: ACTIVE | Noted: 2020-11-27

## 2020-11-28 PROBLEM — E66.01 MORBID OBESITY: Status: ACTIVE | Noted: 2020-11-28

## 2020-11-28 LAB
ANION GAP SERPL CALC-SCNC: 9 MMOL/L (ref 8–16)
BASOPHILS # BLD AUTO: 0.06 K/UL (ref 0–0.2)
BASOPHILS NFR BLD: 0.3 % (ref 0–1.9)
BUN SERPL-MCNC: 12 MG/DL (ref 6–20)
CALCIUM SERPL-MCNC: 8.7 MG/DL (ref 8.7–10.5)
CHLORIDE SERPL-SCNC: 102 MMOL/L (ref 95–110)
CO2 SERPL-SCNC: 25 MMOL/L (ref 23–29)
CREAT SERPL-MCNC: 0.7 MG/DL (ref 0.5–1.4)
DIFFERENTIAL METHOD: ABNORMAL
EOSINOPHIL # BLD AUTO: 0.1 K/UL (ref 0–0.5)
EOSINOPHIL NFR BLD: 0.5 % (ref 0–8)
ERYTHROCYTE [DISTWIDTH] IN BLOOD BY AUTOMATED COUNT: 14 % (ref 11.5–14.5)
EST. GFR  (AFRICAN AMERICAN): >60 ML/MIN/1.73 M^2
EST. GFR  (NON AFRICAN AMERICAN): >60 ML/MIN/1.73 M^2
GLUCOSE SERPL-MCNC: 100 MG/DL (ref 70–110)
HCT VFR BLD AUTO: 38.6 % (ref 40–54)
HGB BLD-MCNC: 12.3 G/DL (ref 14–18)
IMM GRANULOCYTES # BLD AUTO: 0.13 K/UL (ref 0–0.04)
IMM GRANULOCYTES NFR BLD AUTO: 0.7 % (ref 0–0.5)
LYMPHOCYTES # BLD AUTO: 1.3 K/UL (ref 1–4.8)
LYMPHOCYTES NFR BLD: 7.1 % (ref 18–48)
MAGNESIUM SERPL-MCNC: 1.7 MG/DL (ref 1.6–2.6)
MCH RBC QN AUTO: 27.2 PG (ref 27–31)
MCHC RBC AUTO-ENTMCNC: 31.9 G/DL (ref 32–36)
MCV RBC AUTO: 85 FL (ref 82–98)
MONOCYTES # BLD AUTO: 1.5 K/UL (ref 0.3–1)
MONOCYTES NFR BLD: 8.7 % (ref 4–15)
NEUTROPHILS # BLD AUTO: 14.6 K/UL (ref 1.8–7.7)
NEUTROPHILS NFR BLD: 82.7 % (ref 38–73)
NRBC BLD-RTO: 0 /100 WBC
PLATELET # BLD AUTO: 239 K/UL (ref 150–350)
PMV BLD AUTO: 9.7 FL (ref 9.2–12.9)
POTASSIUM SERPL-SCNC: 3.7 MMOL/L (ref 3.5–5.1)
RBC # BLD AUTO: 4.52 M/UL (ref 4.6–6.2)
SODIUM SERPL-SCNC: 136 MMOL/L (ref 136–145)
WBC # BLD AUTO: 17.64 K/UL (ref 3.9–12.7)

## 2020-11-28 PROCEDURE — 99223 1ST HOSP IP/OBS HIGH 75: CPT | Mod: ,,, | Performed by: UROLOGY

## 2020-11-28 PROCEDURE — 63600175 PHARM REV CODE 636 W HCPCS: Performed by: NURSE PRACTITIONER

## 2020-11-28 PROCEDURE — 25000003 PHARM REV CODE 250: Performed by: NURSE PRACTITIONER

## 2020-11-28 PROCEDURE — 96375 TX/PRO/DX INJ NEW DRUG ADDON: CPT

## 2020-11-28 PROCEDURE — 99223 PR INITIAL HOSPITAL CARE,LEVL III: ICD-10-PCS | Mod: ,,, | Performed by: UROLOGY

## 2020-11-28 PROCEDURE — 11000001 HC ACUTE MED/SURG PRIVATE ROOM

## 2020-11-28 PROCEDURE — 87081 CULTURE SCREEN ONLY: CPT

## 2020-11-28 PROCEDURE — 94761 N-INVAS EAR/PLS OXIMETRY MLT: CPT

## 2020-11-28 PROCEDURE — 85025 COMPLETE CBC W/AUTO DIFF WBC: CPT

## 2020-11-28 PROCEDURE — 87491 CHLMYD TRACH DNA AMP PROBE: CPT

## 2020-11-28 PROCEDURE — 36415 COLL VENOUS BLD VENIPUNCTURE: CPT

## 2020-11-28 PROCEDURE — 83735 ASSAY OF MAGNESIUM: CPT

## 2020-11-28 PROCEDURE — 80048 BASIC METABOLIC PNL TOTAL CA: CPT

## 2020-11-28 PROCEDURE — 25000003 PHARM REV CODE 250: Performed by: INTERNAL MEDICINE

## 2020-11-28 RX ORDER — DOXYCYCLINE HYCLATE 100 MG
100 TABLET ORAL EVERY 12 HOURS
Status: DISCONTINUED | OUTPATIENT
Start: 2020-11-28 | End: 2020-11-29 | Stop reason: HOSPADM

## 2020-11-28 RX ORDER — ACETAMINOPHEN 325 MG/1
650 TABLET ORAL EVERY 6 HOURS PRN
Status: DISCONTINUED | OUTPATIENT
Start: 2020-11-28 | End: 2020-11-29 | Stop reason: HOSPADM

## 2020-11-28 RX ORDER — TALC
6 POWDER (GRAM) TOPICAL NIGHTLY PRN
Status: DISCONTINUED | OUTPATIENT
Start: 2020-11-28 | End: 2020-11-29 | Stop reason: HOSPADM

## 2020-11-28 RX ORDER — HYDROCODONE BITARTRATE AND ACETAMINOPHEN 10; 325 MG/1; MG/1
1 TABLET ORAL EVERY 4 HOURS PRN
Status: DISCONTINUED | OUTPATIENT
Start: 2020-11-28 | End: 2020-11-29 | Stop reason: HOSPADM

## 2020-11-28 RX ORDER — HYDROMORPHONE HYDROCHLORIDE 1 MG/ML
1 INJECTION, SOLUTION INTRAMUSCULAR; INTRAVENOUS; SUBCUTANEOUS EVERY 4 HOURS PRN
Status: DISCONTINUED | OUTPATIENT
Start: 2020-11-28 | End: 2020-11-29 | Stop reason: HOSPADM

## 2020-11-28 RX ORDER — SODIUM CHLORIDE 0.9 % (FLUSH) 0.9 %
10 SYRINGE (ML) INJECTION
Status: DISCONTINUED | OUTPATIENT
Start: 2020-11-28 | End: 2020-11-29 | Stop reason: HOSPADM

## 2020-11-28 RX ORDER — ONDANSETRON 2 MG/ML
4 INJECTION INTRAMUSCULAR; INTRAVENOUS EVERY 6 HOURS PRN
Status: DISCONTINUED | OUTPATIENT
Start: 2020-11-28 | End: 2020-11-29 | Stop reason: HOSPADM

## 2020-11-28 RX ORDER — HYDROCODONE BITARTRATE AND ACETAMINOPHEN 5; 325 MG/1; MG/1
1 TABLET ORAL EVERY 4 HOURS PRN
Status: DISCONTINUED | OUTPATIENT
Start: 2020-11-28 | End: 2020-11-29 | Stop reason: HOSPADM

## 2020-11-28 RX ORDER — HYDROMORPHONE HYDROCHLORIDE 1 MG/ML
1 INJECTION, SOLUTION INTRAMUSCULAR; INTRAVENOUS; SUBCUTANEOUS EVERY 4 HOURS PRN
Status: DISCONTINUED | OUTPATIENT
Start: 2020-11-28 | End: 2020-11-28

## 2020-11-28 RX ADMIN — HYDROCODONE BITARTRATE AND ACETAMINOPHEN 1 TABLET: 10; 325 TABLET ORAL at 06:11

## 2020-11-28 RX ADMIN — DOXYCYCLINE HYCLATE 100 MG: 100 TABLET, COATED ORAL at 09:11

## 2020-11-28 RX ADMIN — HYDROMORPHONE HYDROCHLORIDE 1 MG: 1 INJECTION, SOLUTION INTRAMUSCULAR; INTRAVENOUS; SUBCUTANEOUS at 02:11

## 2020-11-28 RX ADMIN — CEFTRIAXONE 1 G: 1 INJECTION, SOLUTION INTRAVENOUS at 03:11

## 2020-11-28 NOTE — ED NOTES
Acknowledge transfer of care of patient. Patient resting in bed with no acute distress noted. Alert and oriented x 3,  and follows commands. Respirations easy and unlabored.  Able to make needs known, updated on plan of care. Cart in low position, side rails up x 2 and call bell in reach. Will continue to monitor

## 2020-11-28 NOTE — CONSULTS
Chief Complaint:  Orchitis/epididymitis    HPI:   Tin Mckeon is a 35 y.o. male that presents to [facility] for evaluation of fevers and testicular discomfort.  Patient has a 3 day history of these issues.  Fevers up to 102.7 as well as lower abdominal pain.  Seven to 8/10 crampy/dull pain located in his testicles.  Never had another episode like this prior.  Does have a history kidney stones but did not require intervention for his stones.  No family history of  cancers.  No erectile dysfunction.  Voids with a good stream and empties his bladder well.  He is sexually active with 1 partner and they use condoms regularly.    PMH:  Past Medical History:   Diagnosis Date    Morbid obesity with BMI of 40.0-44.9, adult     Nephrolithiasis        PSH:  History reviewed. No pertinent surgical history.    Family History:  History reviewed. No pertinent family history.    Social History:  Social History     Tobacco Use    Smoking status: Former Smoker     Types: Cigarettes   Substance Use Topics    Alcohol use: Yes     Alcohol/week: 8.0 standard drinks     Types: 8 Cans of beer per week    Drug use: Never        Review of Systems:  General: No fever, chills  Skin: No rashes  Chest:  Denies cough and sputum production  Heart: Denies chest pain  Resp: Denies dyspnea  Abdomen: Denies diarrhea, abdominal pain, hematemesis, or blood in stool.  Musculoskeletal: No joint stiffness or swelling. Denies back pain.  : see HPI  Neuro: no dizziness or weakness    Allergies:  Patient has no known allergies.    Medications:    Current Facility-Administered Medications:     acetaminophen tablet 650 mg, 650 mg, Oral, Q6H PRN, Poornima Huddleston NP    cefTRIAXone (ROCEPHIN) 1 g/50 mL D5W IVPB, 1 g, Intravenous, Q24H, Poornima Huddleston NP, 1 g at 11/28/20 0325    doxycycline tablet 100 mg, 100 mg, Oral, Q12H, Garo Dhaliwal MD    HYDROcodone-acetaminophen  mg per tablet 1 tablet, 1 tablet, Oral,  Q4H PRN, Poornima Huddleston NP, 1 tablet at 11/28/20 0629    HYDROcodone-acetaminophen 5-325 mg per tablet 1 tablet, 1 tablet, Oral, Q4H PRN, Poornima Huddleston NP    HYDROmorphone injection Syrg 1 mg, 1 mg, Intravenous, Q4H PRN, Poornima Huddleston NP    melatonin tablet 6 mg, 6 mg, Oral, Nightly PRN, Taniya Martin MD    ondansetron injection 4 mg, 4 mg, Intravenous, Q6H PRN, Poornima Huddleston NP    sodium chloride 0.9% flush 10 mL, 10 mL, Intravenous, PRN, Taniya Martin MD    Physical Exam:  Vitals:    11/28/20 0750   BP: (!) 109/55   Pulse: 99   Resp: 14   Temp: 98 °F (36.7 °C)     General: awake, alert, cooperative  Head: NC/AT  Ears: external ears normal  Eyes: sclera normal  Lungs: normal inspiration, NAD  Heart: well-perfused  Abdomen: Soft, NT, ND  : Normal uncirc'd phallus, meatus normal in size and position, BL testicles palpable, tender to palpation, firm bilaterally, no scrotal edema  Back: No CVA TTP, no spine TTP  Skin: The skin is warm and dry  Ext: No c/c/e.  Neuro: grossly intact, AOx3    RADIOLOGY:  US SCROTUM AND TESTICLES 11/28/2020     CLINICAL HISTORY:  Other specified disorders of the male genital organs     TECHNIQUE:  Sonography of the scrotum and testes.     COMPARISON:  None.     FINDINGS:  Right Testicle:   *Size: 4.1 x 2 x 2.5 cm  *Appearance: Normal.  *Flow: Increased arterial and venous flow  *Epididymis: Increased vascularity  *Hydrocele: Present  *Varicocele: Present.  .     Left Testicle:   *Size: 5.2 x 3.4 x 3.6 cm  *Appearance: Normal.  *Flow: Increased arterial and venous flow  *Epididymis: Increased vascularity  *Hydrocele: Mild with septations  *Varicocele: Present     Other findings: None.     Impression:     Bilateral varicoceles     Increased vascularity in the bilateral testicles and epididymis consistent with orchitis and epididymitis     Mild hydrocele bilaterally with septations or complexity on the left    LABS:  I personally reviewed the following lab  values:  Lab Results   Component Value Date    WBC 17.64 (H) 11/28/2020    HGB 12.3 (L) 11/28/2020    HCT 38.6 (L) 11/28/2020     11/28/2020     11/28/2020    K 3.7 11/28/2020     11/28/2020    CREATININE 0.7 11/28/2020    BUN 12 11/28/2020    CO2 25 11/28/2020    ALT 17 11/27/2020    AST 14 11/27/2020       URINALYSIS: urinalysis normal      Assessment/Plan:   Tin Mckeon is a 35 y.o. male with bilateral epididymo-orchitis.     Recommendations:  - continue antibiotics   - scrotal support  - no indication for surgical intervention at this time  - will continue to follow while admitted    Thank you for allowing me the opportunity to participate in this patient's care.     Garo Shaikh MD  Urology

## 2020-11-28 NOTE — DISCHARGE INSTRUCTIONS
Regarding EPIDIDYMITIS, I advised patient to finish all medication prescribed, rest in bed if uncomfortable, use an ice pack or bag of frozen peas to help relieve the pain (wrap the peas or ice pack in a thin cloth and apply to the area for no longer than 20 minutes at one time), elevate the scrotum with a rolled-up towel when resting, wear an athletic supporter or briefs instead of boxers to better support the scrotum, keep penis and scrotum clean, and use a condom to protect against sexually transmitted diseases (STDs). Instructed patient to make a follow-up appointment as directed and to return to the emergency department or seek medical care with primary care provider if any of the following develop: increased pain or swelling in the scrotum; frequent urge or inability to urinate; discharge from the penis; pain during ejaculation; and fever above 100.4°F (38°C)

## 2020-11-28 NOTE — PROGRESS NOTES
[facility] Hospital Medicine  Progress Note    Patient Name: Tin Mckeon  MRN: [id number]  Patient Class: IP- Inpatient   Admission Date: 11/27/2020  Length of Stay: 0 days  Attending Physician: Grayson Ruiz, *  Primary Care Provider: No primary care provider on file.        Subjective:     Principal Problem:Orchitis and epididymitis        HPI:  34 y/o HM with hx of nephrolithiasis, obesity to ED with c/o gradually worsening bilateral testicular pain over since the morning of presentation, associated with fever (Tmax 102.7), lower abdominal pain and bilateral lower back pain. Symptoms are persistent and of moderate severity without known exacerbating or mitigating factors. Denies chest pain, palpitations, SOB, wheezing, orthopnea, N/V/D, constipation, dysuria, hematuria, flank pain, HA, dizziness, weakness, cough, chills, falls/trauma, blurred vision, focal deficits, or recent viral illness. Found in ED to be febrile (102.7) and tachycardic (112) with WBCs 13.59, H&H 13.7&41.7, Na 134, creatinine 0.8, , lactic acid 1.4, procalcitonin 0.24, hepatitis C negative, HIV negative, flu negative, COVID-19 negative; UA uninfected, cxray non-acute; u/s of the scrotum and testicles revealed bilateral varicoceles, increased vascularity in the bilateral testicles and epididymis suggestive of orchitis and epidiymitis and mild hydrocele bilaterally with septations or complexity on the left. Pt reports only a single recent sexual partner (female) and that they use condoms for protection with every encounter. Pt was given tylenol, norco, sepsis IV fluid bolus and zosyn in ED with improvement in temperature to 98.6, and HR to 91 - still c/o significant pain. Hospital medicine was called and the pt was placed in observation on med-surg. Pt is a full code - surrogate decision maker is his friend Desire Catalan.     Overview/Hospital Course:  34 y/o latin male admitted with a dx of epididymo-orchitis .  He was started on IV rocephin and  PO doxy .  Urology was consulted and recs  Medical tx , no surgical intervention at this time .  He report the pain  Has improved .     Interval History:     Review of Systems   Constitutional: Positive for fever. Negative for activity change, chills, diaphoresis and fatigue.   HENT: Negative for congestion, trouble swallowing and voice change.    Eyes: Negative for photophobia and discharge.   Respiratory: Negative for cough, chest tightness, shortness of breath and wheezing.    Cardiovascular: Negative for chest pain, palpitations and leg swelling.   Gastrointestinal: Negative for blood in stool, constipation, diarrhea, nausea and vomiting. Anal bleeding: lower.   Endocrine: Negative for cold intolerance, heat intolerance, polydipsia, polyphagia and polyuria.   Genitourinary: Positive for scrotal swelling and testicular pain. Negative for difficulty urinating, dysuria, flank pain, frequency and urgency.   Musculoskeletal: Positive for back pain (lower). Negative for joint swelling and myalgias.   Skin: Negative for rash and wound.   Neurological: Negative for dizziness, seizures, syncope, facial asymmetry, weakness, light-headedness, numbness and headaches.   Psychiatric/Behavioral: Negative for confusion and hallucinations.     Objective:     Vital Signs (Most Recent):  Temp: 98.6 °F (37 °C) (11/28/20 1625)  Pulse: 92 (11/28/20 1625)  Resp: 18 (11/28/20 1625)  BP: (!) 109/55 (11/28/20 1625)  SpO2: 98 % (11/28/20 1625) Vital Signs (24h Range):  Temp:  [97.2 °F (36.2 °C)-98.9 °F (37.2 °C)] 98.6 °F (37 °C)  Pulse:  [] 92  Resp:  [14-20] 18  SpO2:  [95 %-100 %] 98 %  BP: (106-149)/(54-76) 109/55     Weight: 114.4 kg (252 lb 3.3 oz)  Body mass index is 41.97 kg/m².    Intake/Output Summary (Last 24 hours) at 11/28/2020 1653  Last data filed at 11/28/2020 0629  Gross per 24 hour   Intake 151 ml   Output 1050 ml   Net -899 ml      Physical Exam  Vitals signs reviewed.    Constitutional:       General: He is not in acute distress.     Appearance: Normal appearance. He is ill-appearing. He is not toxic-appearing.   HENT:      Head: Normocephalic and atraumatic.      Nose: Nose normal. No congestion.      Mouth/Throat:      Mouth: Mucous membranes are moist.   Eyes:      General: No scleral icterus.     Pupils: Pupils are equal, round, and reactive to light.   Neck:      Musculoskeletal: Normal range of motion and neck supple. No muscular tenderness.   Cardiovascular:      Rate and Rhythm: Normal rate and regular rhythm.      Pulses: Normal pulses.      Heart sounds: Normal heart sounds.   Pulmonary:      Effort: Pulmonary effort is normal. No respiratory distress.      Breath sounds: Normal breath sounds. No wheezing or rhonchi.   Abdominal:      General: Abdomen is flat. Bowel sounds are normal. There is no distension.      Palpations: Abdomen is soft.      Tenderness: There is abdominal tenderness (slight to B lower quadrants with palpation). There is no guarding or rebound.   Genitourinary:     Scrotum/Testes:         Right: Tenderness, swelling and varicocele present.         Left: Tenderness, swelling and varicocele present.      Epididymis:      Right: Inflamed. Tenderness present.      Left: Inflamed. Tenderness present.   Musculoskeletal: Normal range of motion.         General: No deformity or signs of injury.      Right lower leg: No edema.      Left lower leg: No edema.   Skin:     General: Skin is warm and dry.      Capillary Refill: Capillary refill takes less than 2 seconds.      Coloration: Skin is not jaundiced.      Findings: No bruising or rash.   Neurological:      General: No focal deficit present.      Mental Status: He is alert and oriented to person, place, and time.      Sensory: No sensory deficit.      Motor: No weakness.   Psychiatric:         Mood and Affect: Mood normal.         Behavior: Behavior normal.         Thought Content: Thought content normal.   "       Judgment: Judgment normal.         Significant Labs: All pertinent labs within the past 24 hours have been reviewed.    Significant Imaging: I have reviewed all pertinent imaging results/findings within the past 24 hours.      Assessment/Plan:      * Sepsis d/t orchitis and epididymitis, bilateral    -U/s scrotum/testicles with "increased vascularity in the bilateral testicles and epididymis consistent with orchitis and epidiymitis"  -Urology on board  -Blood  cx NGTD   -Gonorrhea/chlamydia screening pending   -Continue IV rocephin for now  -Provide adequate pain control  -Tylenol prn fever    Morbid obesity  Educated on health benefits of weight loss  Advised on diet/exercise choices to improve weight loss        VTE Risk Mitigation (From admission, onward)         Ordered     IP VTE LOW RISK PATIENT  Once      11/28/20 0206     Place sequential compression device  Until discontinued      11/28/20 0206                Discharge Planning   NAOMY:      Code Status: Full Code   Is the patient medically ready for discharge?:     Reason for patient still in hospital (select all that apply): Treatment                     Grayson Ruiz MD  Department of Hospital Medicine   [facility]  "

## 2020-11-28 NOTE — HPI
34 y/o HM with hx of nephrolithiasis, obesity to ED with c/o gradually worsening bilateral testicular pain over since the morning of presentation, associated with fever (Tmax 102.7), lower abdominal pain and bilateral lower back pain. Symptoms are persistent and of moderate severity without known exacerbating or mitigating factors. Denies chest pain, palpitations, SOB, wheezing, orthopnea, N/V/D, constipation, dysuria, hematuria, flank pain, HA, dizziness, weakness, cough, chills, falls/trauma, blurred vision, focal deficits, or recent viral illness. Found in ED to be febrile (102.7) and tachycardic (112) with WBCs 13.59, H&H 13.7&41.7, Na 134, creatinine 0.8, , lactic acid 1.4, procalcitonin 0.24, hepatitis C negative, HIV negative, flu negative, COVID-19 negative; UA uninfected, cxray non-acute; u/s of the scrotum and testicles revealed bilateral varicoceles, increased vascularity in the bilateral testicles and epididymis suggestive of orchitis and epidiymitis and mild hydrocele bilaterally with septations or complexity on the left. Pt reports only a single recent sexual partner (female) and that they use condoms for protection with every encounter. Pt was given tylenol, norco, sepsis IV fluid bolus and zosyn in ED with improvement in temperature to 98.6, and HR to 91 - still c/o significant pain. Hospital medicine was called and the pt was placed in observation on med-surg. Pt is a full code - surrogate decision maker is his friend Desire Catalan.

## 2020-11-28 NOTE — H&P
[facility] Hospital Medicine  History & Physical    Patient Name: Tin Mckoen  MRN: [id number]  Admission Date: 11/27/2020  Attending Physician: Garo Dhaliwal MD   Primary Care Provider: No primary care provider on file.         Patient information was obtained from patient, past medical records and ER records.     Subjective:     Principal Problem:Orchitis and epididymitis    Chief Complaint:   Chief Complaint   Patient presents with    Fever     States fever began today    Testicle Pain     seen here previously for same.  states bilateral pain        HPI: 36 y/o HM with hx of nephrolithiasis, obesity to ED with c/o gradually worsening bilateral testicular pain over since the morning of presentation, associated with fever (Tmax 102.7), lower abdominal pain and bilateral lower back pain. Symptoms are persistent and of moderate severity without known exacerbating or mitigating factors. Denies chest pain, palpitations, SOB, wheezing, orthopnea, N/V/D, constipation, dysuria, hematuria, flank pain, HA, dizziness, weakness, cough, chills, falls/trauma, blurred vision, focal deficits, or recent viral illness. Found in ED to be febrile (102.7) and tachycardic (112) with WBCs 13.59, H&H 13.7&41.7, Na 134, creatinine 0.8, , lactic acid 1.4, procalcitonin 0.24, hepatitis C negative, HIV negative, flu negative, COVID-19 negative; UA uninfected, cxray non-acute; u/s of the scrotum and testicles revealed bilateral varicoceles, increased vascularity in the bilateral testicles and epididymis suggestive of orchitis and epidiymitis and mild hydrocele bilaterally with septations or complexity on the left. Pt reports only a single recent sexual partner (female) and that they use condoms for protection with every encounter. Pt was given tylenol, norco, sepsis IV fluid bolus and zosyn in ED with improvement in temperature to 98.6, and HR to 91 - still c/o significant pain. Hospital medicine was called and the  pt was placed in observation on med-surg. Pt is a full code - surrogate decision maker is his friend Desire Catalan.     Past Medical History:   Diagnosis Date    Morbid obesity with BMI of 40.0-44.9, adult     Nephrolithiasis        History reviewed. No pertinent surgical history.    Review of patient's allergies indicates:  No Known Allergies    No current facility-administered medications on file prior to encounter.      Current Outpatient Medications on File Prior to Encounter   Medication Sig    naproxen (NAPROSYN) 375 MG tablet Take 1 tablet (375 mg total) by mouth 2 (two) times daily with meals.    tamsulosin (FLOMAX) 0.4 mg Cp24 Take 1 capsule (0.4 mg total) by mouth once daily.     Family History     Reviewed and not pertinent        Tobacco Use    Smoking status: Former Smoker     Types: Cigarettes   Substance and Sexual Activity    Alcohol use: Yes     Alcohol/week: 8.0 standard drinks     Types: 8 Cans of beer per week    Drug use: Never    Sexual activity: Yes     Partners: Female     Birth control/protection: Condom     Review of Systems   Constitutional: Positive for fever. Negative for activity change, chills, diaphoresis and fatigue.   HENT: Negative for congestion, trouble swallowing and voice change.    Eyes: Negative for photophobia and discharge.   Respiratory: Negative for cough, chest tightness, shortness of breath and wheezing.    Cardiovascular: Negative for chest pain, palpitations and leg swelling.   Gastrointestinal: Positive for abdominal pain. Negative for blood in stool, constipation, diarrhea, nausea and vomiting. Anal bleeding: lower.   Endocrine: Negative for cold intolerance, heat intolerance, polydipsia, polyphagia and polyuria.   Genitourinary: Positive for scrotal swelling and testicular pain. Negative for difficulty urinating, dysuria, flank pain, frequency and urgency.   Musculoskeletal: Positive for back pain (lower). Negative for joint swelling and myalgias.   Skin:  Negative for rash and wound.   Neurological: Negative for dizziness, seizures, syncope, facial asymmetry, weakness, light-headedness, numbness and headaches.   Psychiatric/Behavioral: Negative for confusion and hallucinations.     Objective:     Vital Signs (Most Recent):  Temp: 98.1 °F (36.7 °C) (11/28/20 0213)  Pulse: 102 (11/28/20 0213)  Resp: 18 (11/28/20 0213)  BP: 123/76 (11/28/20 0213)  SpO2: 99 % (11/28/20 0213) Vital Signs (24h Range):  Temp:  [98.1 °F (36.7 °C)-102.7 °F (39.3 °C)] 98.1 °F (36.7 °C)  Pulse:  [] 102  Resp:  [18-20] 18  SpO2:  [94 %-100 %] 99 %  BP: (106-149)/(54-76) 123/76     Weight: 114.4 kg (252 lb 3.3 oz)  Body mass index is 41.97 kg/m².    Physical Exam  Vitals signs reviewed.   Constitutional:       General: He is not in acute distress.     Appearance: Normal appearance. He is ill-appearing. He is not toxic-appearing.   HENT:      Head: Normocephalic and atraumatic.      Nose: Nose normal. No congestion.      Mouth/Throat:      Mouth: Mucous membranes are moist.   Eyes:      General: No scleral icterus.     Pupils: Pupils are equal, round, and reactive to light.   Neck:      Musculoskeletal: Normal range of motion and neck supple. No muscular tenderness.   Cardiovascular:      Rate and Rhythm: Normal rate and regular rhythm.      Pulses: Normal pulses.      Heart sounds: Normal heart sounds.   Pulmonary:      Effort: Pulmonary effort is normal. No respiratory distress.      Breath sounds: Normal breath sounds. No wheezing or rhonchi.   Abdominal:      General: Abdomen is flat. Bowel sounds are normal. There is no distension.      Palpations: Abdomen is soft.      Tenderness: There is abdominal tenderness (slight to B lower quadrants with palpation). There is no guarding or rebound.   Genitourinary:     Scrotum/Testes:         Right: Tenderness, swelling and varicocele present.         Left: Tenderness, swelling and varicocele present.      Epididymis:      Right: Inflamed.  Tenderness present.      Left: Inflamed. Tenderness present.   Musculoskeletal: Normal range of motion.         General: No deformity or signs of injury.      Right lower leg: No edema.      Left lower leg: No edema.   Skin:     General: Skin is warm and dry.      Capillary Refill: Capillary refill takes less than 2 seconds.      Coloration: Skin is not jaundiced.      Findings: No bruising or rash.   Neurological:      General: No focal deficit present.      Mental Status: He is alert and oriented to person, place, and time.      Sensory: No sensory deficit.      Motor: No weakness.   Psychiatric:         Mood and Affect: Mood normal.         Behavior: Behavior normal.         Thought Content: Thought content normal.         Judgment: Judgment normal.          Significant Labs:   Results for orders placed or performed during the hospital encounter of 11/27/20   Influenza A & B by Molecular    Specimen: Nasopharyngeal Swab   Result Value Ref Range    Influenza A, Molecular Negative Negative    Influenza B, Molecular Negative Negative    Flu A & B Source Nasal swab    HIV 1/2 Ag/Ab (4th Gen)   Result Value Ref Range    HIV 1/2 Ag/Ab Negative Negative   Hepatitis C antibody   Result Value Ref Range    Hepatitis C Ab Negative Negative   CBC auto differential   Result Value Ref Range    WBC 13.59 (H) 3.90 - 12.70 K/uL    RBC 5.05 4.60 - 6.20 M/uL    Hemoglobin 13.7 (L) 14.0 - 18.0 g/dL    Hematocrit 41.7 40.0 - 54.0 %    MCV 83 82 - 98 fL    MCH 27.1 27.0 - 31.0 pg    MCHC 32.9 32.0 - 36.0 g/dL    RDW 13.9 11.5 - 14.5 %    Platelets 269 150 - 350 K/uL    MPV 9.9 9.2 - 12.9 fL    Immature Granulocytes 0.5 0.0 - 0.5 %    Gran # (ANC) 12.1 (H) 1.8 - 7.7 K/uL    Immature Grans (Abs) 0.07 (H) 0.00 - 0.04 K/uL    Lymph # 0.6 (L) 1.0 - 4.8 K/uL    Mono # 0.8 0.3 - 1.0 K/uL    Eos # 0.0 0.0 - 0.5 K/uL    Baso # 0.03 0.00 - 0.20 K/uL    nRBC 0 0 /100 WBC    Gran % 88.9 (H) 38.0 - 73.0 %    Lymph % 4.5 (L) 18.0 - 48.0 %    Mono %  5.9 4.0 - 15.0 %    Eosinophil % 0.0 0.0 - 8.0 %    Basophil % 0.2 0.0 - 1.9 %    Differential Method Automated    Comprehensive metabolic panel   Result Value Ref Range    Sodium 134 (L) 136 - 145 mmol/L    Potassium 3.8 3.5 - 5.1 mmol/L    Chloride 97 95 - 110 mmol/L    CO2 26 23 - 29 mmol/L    Glucose 109 70 - 110 mg/dL    BUN 14 6 - 20 mg/dL    Creatinine 0.8 0.5 - 1.4 mg/dL    Calcium 10.1 8.7 - 10.5 mg/dL    Total Protein 8.5 (H) 6.0 - 8.4 g/dL    Albumin 3.6 3.5 - 5.2 g/dL    Total Bilirubin 0.6 0.1 - 1.0 mg/dL    Alkaline Phosphatase 97 55 - 135 U/L    AST 14 10 - 40 U/L    ALT 17 10 - 44 U/L    Anion Gap 11 8 - 16 mmol/L    eGFR if African American >60 >60 mL/min/1.73 m^2    eGFR if non African American >60 >60 mL/min/1.73 m^2   Lactic acid, plasma #1   Result Value Ref Range    Lactate (Lactic Acid) 1.4 0.5 - 2.2 mmol/L   Urinalysis, Reflex to Urine Culture Urine, Clean Catch    Specimen: Urine   Result Value Ref Range    Specimen UA Urine, Catheterized     Color, UA Yellow Yellow, Straw, Enriqueta    Appearance, UA Clear Clear    pH, UA >8.0 (A) 5.0 - 8.0    Specific Gravity, UA 1.015 1.005 - 1.030    Protein, UA Negative Negative    Glucose, UA Negative Negative    Ketones, UA Negative Negative    Bilirubin (UA) Negative Negative    Occult Blood UA Negative Negative    Nitrite, UA Negative Negative    Urobilinogen, UA Negative <2.0 EU/dL    Leukocytes, UA Negative Negative   Lipase   Result Value Ref Range    Lipase 12 4 - 60 U/L   Procalcitonin   Result Value Ref Range    Procalcitonin 0.24 <0.25 ng/mL   COVID-19 Rapid Screening   Result Value Ref Range    SARS-CoV-2 RNA, Amplification, Qual Negative Negative         Significant Imaging:   Imaging Results          X-Ray Chest AP Portable (Final result)  Result time 11/27/20 19:02:53    Final result by Kenny Alves MD (11/27/20 19:02:53)                 Impression:      No acute abnormality.      Electronically signed by: Long  "Kenny  Date:    11/27/2020  Time:    19:02             Narrative:    EXAMINATION:  XR CHEST AP PORTABLE    CLINICAL HISTORY:  Sepsis;    TECHNIQUE:  Single frontal view of the chest was performed.    COMPARISON:  None    FINDINGS:  The lungs are clear, with normal appearance of pulmonary vasculature and no pleural effusion or pneumothorax.    The cardiac silhouette is normal in size. The hilar and mediastinal contours are unremarkable.    Bones are intact.                               US Scrotum And Testicles (Final result)  Result time 11/27/20 18:29:02    Final result by Kenny Alves MD (11/27/20 18:29:02)                 Impression:      Bilateral varicoceles    Increased vascularity in the bilateral testicles and epididymis consistent with orchitis and epididymitis    Mild hydrocele bilaterally with septations or complexity on the left      Electronically signed by: Long Cox  Date:    11/27/2020  Time:    18:29             Narrative:    EXAMINATION:  US SCROTUM AND TESTICLES    CLINICAL HISTORY:  Other specified disorders of the male genital organs    TECHNIQUE:  Sonography of the scrotum and testes.    COMPARISON:  None.    FINDINGS:  Right Testicle:    *Size: 4.1 x 2 x 2.5 cm  *Appearance: Normal.  *Flow: Increased arterial and venous flow  *Epididymis: Increased vascularity  *Hydrocele: Present  *Varicocele: Present.  .    Left Testicle:    *Size: 5.2 x 3.4 x 3.6 cm  *Appearance: Normal.  *Flow: Increased arterial and venous flow  *Epididymis: Increased vascularity  *Hydrocele: Mild with septations  *Varicocele: Present  .    Other findings: None.                              EKG 11/27/2020 at 1703 - S. Tachycardia (115 BPM); otherwise normal    Assessment/Plan:     * Sepsis d/t orchitis and epididymitis, bilateral  Febrile, tachycardic with leukocytosis (13.59)  U/s scrotum/testicles with "increased vascularity in the bilateral testicles and epididymis consistent with orchitis and " "epidiymitis"  Received sepsis IV fluid bolus in ED - monitor for fluid overload  Urology consulted  BC x2 pending  Gonorrhea/chlamydia screening pending - reports strict condom use  Continue IV rocephin for now  Provide adequate pain control  Tylenol prn fever    Morbid obesity  Educated on health benefits of weight loss  Advised on diet/exercise choices to improve weight loss        VTE Risk Mitigation (From admission, onward)         Ordered     IP VTE LOW RISK PATIENT  Once      11/28/20 0206     Place sequential compression device  Until discontinued      11/28/20 0206                   Poornima Huddleston NP  Department of Hospital Medicine   [facility]  "

## 2020-11-28 NOTE — PLAN OF CARE
Fall precautions maintained. Scrotum remains swollen. Pain is well controlled. VSS. All needs met. NSR on cardiac monitor. Will continue to monitor.

## 2020-11-28 NOTE — HOSPITAL COURSE
36 y/o latin male admitted with a dx of epididymo-orchitis . He was started on IV rocephin and  PO doxy .  Urology was consulted and recs  Medical tx , no surgical intervention at this time .  He report the pain  Has improved .   11/29 Pt was seen and examined at bedside . He was determined to be suitable for d/c   -Pt scrotal swelling improved with IVAB   -Urology was consulted and recs medical x and no surgical intervention  -Blood cx , Gonorrhea and chlamydia  ngtd  -He will be d/c on  Levaquin po . He took doxy  X 10 days with no improvement

## 2020-11-28 NOTE — SUBJECTIVE & OBJECTIVE
Past Medical History:   Diagnosis Date    Morbid obesity with BMI of 40.0-44.9, adult     Nephrolithiasis        History reviewed. No pertinent surgical history.    Review of patient's allergies indicates:  No Known Allergies    No current facility-administered medications on file prior to encounter.      Current Outpatient Medications on File Prior to Encounter   Medication Sig    naproxen (NAPROSYN) 375 MG tablet Take 1 tablet (375 mg total) by mouth 2 (two) times daily with meals.    tamsulosin (FLOMAX) 0.4 mg Cp24 Take 1 capsule (0.4 mg total) by mouth once daily.     Family History     None        Tobacco Use    Smoking status: Former Smoker     Types: Cigarettes   Substance and Sexual Activity    Alcohol use: Yes     Alcohol/week: 8.0 standard drinks     Types: 8 Cans of beer per week    Drug use: Never    Sexual activity: Yes     Partners: Female     Birth control/protection: Condom     Review of Systems   Constitutional: Positive for fever. Negative for activity change, chills, diaphoresis and fatigue.   HENT: Negative for congestion, trouble swallowing and voice change.    Eyes: Negative for photophobia and discharge.   Respiratory: Negative for cough, chest tightness, shortness of breath and wheezing.    Cardiovascular: Negative for chest pain, palpitations and leg swelling.   Gastrointestinal: Positive for abdominal pain. Negative for blood in stool, constipation, diarrhea, nausea and vomiting. Anal bleeding: lower.   Endocrine: Negative for cold intolerance, heat intolerance, polydipsia, polyphagia and polyuria.   Genitourinary: Positive for scrotal swelling and testicular pain. Negative for difficulty urinating, dysuria, flank pain, frequency and urgency.   Musculoskeletal: Positive for back pain (lower). Negative for joint swelling and myalgias.   Skin: Negative for rash and wound.   Neurological: Negative for dizziness, seizures, syncope, facial asymmetry, weakness, light-headedness, numbness  and headaches.   Psychiatric/Behavioral: Negative for confusion and hallucinations.     Objective:     Vital Signs (Most Recent):  Temp: 98.1 °F (36.7 °C) (11/28/20 0213)  Pulse: 102 (11/28/20 0213)  Resp: 18 (11/28/20 0213)  BP: 123/76 (11/28/20 0213)  SpO2: 99 % (11/28/20 0213) Vital Signs (24h Range):  Temp:  [98.1 °F (36.7 °C)-102.7 °F (39.3 °C)] 98.1 °F (36.7 °C)  Pulse:  [] 102  Resp:  [18-20] 18  SpO2:  [94 %-100 %] 99 %  BP: (106-149)/(54-76) 123/76     Weight: 114.4 kg (252 lb 3.3 oz)  Body mass index is 41.97 kg/m².    Physical Exam  Vitals signs reviewed.   Constitutional:       General: He is not in acute distress.     Appearance: Normal appearance. He is ill-appearing. He is not toxic-appearing.   HENT:      Head: Normocephalic and atraumatic.      Nose: Nose normal. No congestion.      Mouth/Throat:      Mouth: Mucous membranes are moist.   Eyes:      General: No scleral icterus.     Pupils: Pupils are equal, round, and reactive to light.   Neck:      Musculoskeletal: Normal range of motion and neck supple. No muscular tenderness.   Cardiovascular:      Rate and Rhythm: Normal rate and regular rhythm.      Pulses: Normal pulses.      Heart sounds: Normal heart sounds.   Pulmonary:      Effort: Pulmonary effort is normal. No respiratory distress.      Breath sounds: Normal breath sounds. No wheezing or rhonchi.   Abdominal:      General: Abdomen is flat. Bowel sounds are normal. There is no distension.      Palpations: Abdomen is soft.      Tenderness: There is abdominal tenderness (slight to B lower quadrants with palpation). There is no guarding or rebound.   Genitourinary:     Scrotum/Testes:         Right: Tenderness, swelling and varicocele present.         Left: Tenderness, swelling and varicocele present.      Epididymis:      Right: Inflamed. Tenderness present.      Left: Inflamed. Tenderness present.   Musculoskeletal: Normal range of motion.         General: No deformity or signs of  injury.      Right lower leg: No edema.      Left lower leg: No edema.   Skin:     General: Skin is warm and dry.      Capillary Refill: Capillary refill takes less than 2 seconds.      Coloration: Skin is not jaundiced.      Findings: No bruising or rash.   Neurological:      General: No focal deficit present.      Mental Status: He is alert and oriented to person, place, and time.      Sensory: No sensory deficit.      Motor: No weakness.   Psychiatric:         Mood and Affect: Mood normal.         Behavior: Behavior normal.         Thought Content: Thought content normal.         Judgment: Judgment normal.          Significant Labs:   Results for orders placed or performed during the hospital encounter of 11/27/20   Influenza A & B by Molecular    Specimen: Nasopharyngeal Swab   Result Value Ref Range    Influenza A, Molecular Negative Negative    Influenza B, Molecular Negative Negative    Flu A & B Source Nasal swab    HIV 1/2 Ag/Ab (4th Gen)   Result Value Ref Range    HIV 1/2 Ag/Ab Negative Negative   Hepatitis C antibody   Result Value Ref Range    Hepatitis C Ab Negative Negative   CBC auto differential   Result Value Ref Range    WBC 13.59 (H) 3.90 - 12.70 K/uL    RBC 5.05 4.60 - 6.20 M/uL    Hemoglobin 13.7 (L) 14.0 - 18.0 g/dL    Hematocrit 41.7 40.0 - 54.0 %    MCV 83 82 - 98 fL    MCH 27.1 27.0 - 31.0 pg    MCHC 32.9 32.0 - 36.0 g/dL    RDW 13.9 11.5 - 14.5 %    Platelets 269 150 - 350 K/uL    MPV 9.9 9.2 - 12.9 fL    Immature Granulocytes 0.5 0.0 - 0.5 %    Gran # (ANC) 12.1 (H) 1.8 - 7.7 K/uL    Immature Grans (Abs) 0.07 (H) 0.00 - 0.04 K/uL    Lymph # 0.6 (L) 1.0 - 4.8 K/uL    Mono # 0.8 0.3 - 1.0 K/uL    Eos # 0.0 0.0 - 0.5 K/uL    Baso # 0.03 0.00 - 0.20 K/uL    nRBC 0 0 /100 WBC    Gran % 88.9 (H) 38.0 - 73.0 %    Lymph % 4.5 (L) 18.0 - 48.0 %    Mono % 5.9 4.0 - 15.0 %    Eosinophil % 0.0 0.0 - 8.0 %    Basophil % 0.2 0.0 - 1.9 %    Differential Method Automated    Comprehensive metabolic panel    Result Value Ref Range    Sodium 134 (L) 136 - 145 mmol/L    Potassium 3.8 3.5 - 5.1 mmol/L    Chloride 97 95 - 110 mmol/L    CO2 26 23 - 29 mmol/L    Glucose 109 70 - 110 mg/dL    BUN 14 6 - 20 mg/dL    Creatinine 0.8 0.5 - 1.4 mg/dL    Calcium 10.1 8.7 - 10.5 mg/dL    Total Protein 8.5 (H) 6.0 - 8.4 g/dL    Albumin 3.6 3.5 - 5.2 g/dL    Total Bilirubin 0.6 0.1 - 1.0 mg/dL    Alkaline Phosphatase 97 55 - 135 U/L    AST 14 10 - 40 U/L    ALT 17 10 - 44 U/L    Anion Gap 11 8 - 16 mmol/L    eGFR if African American >60 >60 mL/min/1.73 m^2    eGFR if non African American >60 >60 mL/min/1.73 m^2   Lactic acid, plasma #1   Result Value Ref Range    Lactate (Lactic Acid) 1.4 0.5 - 2.2 mmol/L   Urinalysis, Reflex to Urine Culture Urine, Clean Catch    Specimen: Urine   Result Value Ref Range    Specimen UA Urine, Catheterized     Color, UA Yellow Yellow, Straw, Enriqueta    Appearance, UA Clear Clear    pH, UA >8.0 (A) 5.0 - 8.0    Specific Gravity, UA 1.015 1.005 - 1.030    Protein, UA Negative Negative    Glucose, UA Negative Negative    Ketones, UA Negative Negative    Bilirubin (UA) Negative Negative    Occult Blood UA Negative Negative    Nitrite, UA Negative Negative    Urobilinogen, UA Negative <2.0 EU/dL    Leukocytes, UA Negative Negative   Lipase   Result Value Ref Range    Lipase 12 4 - 60 U/L   Procalcitonin   Result Value Ref Range    Procalcitonin 0.24 <0.25 ng/mL   COVID-19 Rapid Screening   Result Value Ref Range    SARS-CoV-2 RNA, Amplification, Qual Negative Negative         Significant Imaging:   Imaging Results          X-Ray Chest AP Portable (Final result)  Result time 11/27/20 19:02:53    Final result by Kenny Alves MD (11/27/20 19:02:53)                 Impression:      No acute abnormality.      Electronically signed by: Long Cox  Date:    11/27/2020  Time:    19:02             Narrative:    EXAMINATION:  XR CHEST AP PORTABLE    CLINICAL HISTORY:  Sepsis;    TECHNIQUE:  Single frontal view of  the chest was performed.    COMPARISON:  None    FINDINGS:  The lungs are clear, with normal appearance of pulmonary vasculature and no pleural effusion or pneumothorax.    The cardiac silhouette is normal in size. The hilar and mediastinal contours are unremarkable.    Bones are intact.                               US Scrotum And Testicles (Final result)  Result time 11/27/20 18:29:02    Final result by Kenny Alves MD (11/27/20 18:29:02)                 Impression:      Bilateral varicoceles    Increased vascularity in the bilateral testicles and epididymis consistent with orchitis and epididymitis    Mild hydrocele bilaterally with septations or complexity on the left      Electronically signed by: Long Cox  Date:    11/27/2020  Time:    18:29             Narrative:    EXAMINATION:  US SCROTUM AND TESTICLES    CLINICAL HISTORY:  Other specified disorders of the male genital organs    TECHNIQUE:  Sonography of the scrotum and testes.    COMPARISON:  None.    FINDINGS:  Right Testicle:    *Size: 4.1 x 2 x 2.5 cm  *Appearance: Normal.  *Flow: Increased arterial and venous flow  *Epididymis: Increased vascularity  *Hydrocele: Present  *Varicocele: Present.  .    Left Testicle:    *Size: 5.2 x 3.4 x 3.6 cm  *Appearance: Normal.  *Flow: Increased arterial and venous flow  *Epididymis: Increased vascularity  *Hydrocele: Mild with septations  *Varicocele: Present  .    Other findings: None.                              EKG 11/27/2020 at 1703 - S. Tachycardia (115 BPM); otherwise normal

## 2020-11-28 NOTE — ED NOTES
Pt AAOx3, resting in bed, eyes closed side rails up x 2, call bell within reach, even unlabored respiration with equal rise and fall of the chest wall. NAD at this time. Will continue to monitor.

## 2020-11-28 NOTE — ASSESSMENT & PLAN NOTE
Educated on health benefits of weight loss  Advised on diet/exercise choices to improve weight loss

## 2020-11-28 NOTE — ASSESSMENT & PLAN NOTE
Educated on health benefits of weight loss  Advised on diet/exercise choices to improve weight loss     0.21

## 2020-11-28 NOTE — PLAN OF CARE
Problem: Adult Inpatient Plan of Care  Goal: Plan of Care Review  Outcome: Ongoing, Progressing  Flowsheets (Taken 11/28/2020 0456)  Plan of Care Reviewed With: patient  Pt had no adverse events during shift. Pt free from falls. Call light in reach. SR's x2. Pain well controlled w/ PRN meds. Pt repositions independently and with encouragement. IVF/abx administered as ordered. VTE prophylaxis - refusing interventions, ambulation encouraged, fluids promoted. Heart monitor (NS - ST. 90's - 100's). Other VSS. Scrotal swelling noted per admission assessment, frequent turning encouraged. Chart reviewed. Will continue to monitor.

## 2020-11-28 NOTE — ASSESSMENT & PLAN NOTE
"Febrile, tachycardic with leukocytosis (13.59)  U/s scrotum/testicles with "increased vascularity in the bilateral testicles and epididymis consistent with orchitis and epidiymitis"  Received sepsis IV fluid bolus in ED - monitor for fluid overload  Urology consulted  BC x2 pending  Gonorrhea/chlamydia screening pending  Continue IV rocephin for now  Provide adequate pain control  Tylenol prn fever  "

## 2020-11-28 NOTE — SUBJECTIVE & OBJECTIVE
Interval History:     Review of Systems   Constitutional: Positive for fever. Negative for activity change, chills, diaphoresis and fatigue.   HENT: Negative for congestion, trouble swallowing and voice change.    Eyes: Negative for photophobia and discharge.   Respiratory: Negative for cough, chest tightness, shortness of breath and wheezing.    Cardiovascular: Negative for chest pain, palpitations and leg swelling.   Gastrointestinal: Negative for blood in stool, constipation, diarrhea, nausea and vomiting. Anal bleeding: lower.   Endocrine: Negative for cold intolerance, heat intolerance, polydipsia, polyphagia and polyuria.   Genitourinary: Positive for scrotal swelling and testicular pain. Negative for difficulty urinating, dysuria, flank pain, frequency and urgency.   Musculoskeletal: Positive for back pain (lower). Negative for joint swelling and myalgias.   Skin: Negative for rash and wound.   Neurological: Negative for dizziness, seizures, syncope, facial asymmetry, weakness, light-headedness, numbness and headaches.   Psychiatric/Behavioral: Negative for confusion and hallucinations.     Objective:     Vital Signs (Most Recent):  Temp: 98.6 °F (37 °C) (11/28/20 1625)  Pulse: 92 (11/28/20 1625)  Resp: 18 (11/28/20 1625)  BP: (!) 109/55 (11/28/20 1625)  SpO2: 98 % (11/28/20 1625) Vital Signs (24h Range):  Temp:  [97.2 °F (36.2 °C)-98.9 °F (37.2 °C)] 98.6 °F (37 °C)  Pulse:  [] 92  Resp:  [14-20] 18  SpO2:  [95 %-100 %] 98 %  BP: (106-149)/(54-76) 109/55     Weight: 114.4 kg (252 lb 3.3 oz)  Body mass index is 41.97 kg/m².    Intake/Output Summary (Last 24 hours) at 11/28/2020 1653  Last data filed at 11/28/2020 0629  Gross per 24 hour   Intake 151 ml   Output 1050 ml   Net -899 ml      Physical Exam  Vitals signs reviewed.   Constitutional:       General: He is not in acute distress.     Appearance: Normal appearance. He is ill-appearing. He is not toxic-appearing.   HENT:      Head: Normocephalic and  atraumatic.      Nose: Nose normal. No congestion.      Mouth/Throat:      Mouth: Mucous membranes are moist.   Eyes:      General: No scleral icterus.     Pupils: Pupils are equal, round, and reactive to light.   Neck:      Musculoskeletal: Normal range of motion and neck supple. No muscular tenderness.   Cardiovascular:      Rate and Rhythm: Normal rate and regular rhythm.      Pulses: Normal pulses.      Heart sounds: Normal heart sounds.   Pulmonary:      Effort: Pulmonary effort is normal. No respiratory distress.      Breath sounds: Normal breath sounds. No wheezing or rhonchi.   Abdominal:      General: Abdomen is flat. Bowel sounds are normal. There is no distension.      Palpations: Abdomen is soft.      Tenderness: There is abdominal tenderness (slight to B lower quadrants with palpation). There is no guarding or rebound.   Genitourinary:     Scrotum/Testes:         Right: Tenderness, swelling and varicocele present.         Left: Tenderness, swelling and varicocele present.      Epididymis:      Right: Inflamed. Tenderness present.      Left: Inflamed. Tenderness present.   Musculoskeletal: Normal range of motion.         General: No deformity or signs of injury.      Right lower leg: No edema.      Left lower leg: No edema.   Skin:     General: Skin is warm and dry.      Capillary Refill: Capillary refill takes less than 2 seconds.      Coloration: Skin is not jaundiced.      Findings: No bruising or rash.   Neurological:      General: No focal deficit present.      Mental Status: He is alert and oriented to person, place, and time.      Sensory: No sensory deficit.      Motor: No weakness.   Psychiatric:         Mood and Affect: Mood normal.         Behavior: Behavior normal.         Thought Content: Thought content normal.         Judgment: Judgment normal.         Significant Labs: All pertinent labs within the past 24 hours have been reviewed.    Significant Imaging: I have reviewed all pertinent  imaging results/findings within the past 24 hours.

## 2020-11-28 NOTE — ASSESSMENT & PLAN NOTE
"  -U/s scrotum/testicles with "increased vascularity in the bilateral testicles and epididymis consistent with orchitis and epidiymitis"  -Urology on board  -Blood  cx NGTD   -Gonorrhea/chlamydia screening pending   -Continue IV rocephin for now  -Provide adequate pain control  -Tylenol prn fever  "

## 2020-11-29 VITALS
BODY MASS INDEX: 42.02 KG/M2 | WEIGHT: 252.19 LBS | SYSTOLIC BLOOD PRESSURE: 105 MMHG | HEART RATE: 75 BPM | HEIGHT: 65 IN | DIASTOLIC BLOOD PRESSURE: 54 MMHG | RESPIRATION RATE: 19 BRPM | TEMPERATURE: 98 F | OXYGEN SATURATION: 98 %

## 2020-11-29 LAB
ANION GAP SERPL CALC-SCNC: 10 MMOL/L (ref 8–16)
BASOPHILS # BLD AUTO: 0.02 K/UL (ref 0–0.2)
BASOPHILS NFR BLD: 0.2 % (ref 0–1.9)
BUN SERPL-MCNC: 13 MG/DL (ref 6–20)
C TRACH DNA SPEC QL NAA+PROBE: NOT DETECTED
CALCIUM SERPL-MCNC: 8.9 MG/DL (ref 8.7–10.5)
CHLORIDE SERPL-SCNC: 103 MMOL/L (ref 95–110)
CO2 SERPL-SCNC: 25 MMOL/L (ref 23–29)
CREAT SERPL-MCNC: 0.7 MG/DL (ref 0.5–1.4)
DIFFERENTIAL METHOD: ABNORMAL
EOSINOPHIL # BLD AUTO: 0.1 K/UL (ref 0–0.5)
EOSINOPHIL NFR BLD: 1.3 % (ref 0–8)
ERYTHROCYTE [DISTWIDTH] IN BLOOD BY AUTOMATED COUNT: 13.7 % (ref 11.5–14.5)
EST. GFR  (AFRICAN AMERICAN): >60 ML/MIN/1.73 M^2
EST. GFR  (NON AFRICAN AMERICAN): >60 ML/MIN/1.73 M^2
GLUCOSE SERPL-MCNC: 103 MG/DL (ref 70–110)
HCT VFR BLD AUTO: 36.1 % (ref 40–54)
HGB BLD-MCNC: 11.9 G/DL (ref 14–18)
IMM GRANULOCYTES # BLD AUTO: 0.06 K/UL (ref 0–0.04)
IMM GRANULOCYTES NFR BLD AUTO: 0.6 % (ref 0–0.5)
LYMPHOCYTES # BLD AUTO: 1.5 K/UL (ref 1–4.8)
LYMPHOCYTES NFR BLD: 15.3 % (ref 18–48)
MAGNESIUM SERPL-MCNC: 2.1 MG/DL (ref 1.6–2.6)
MCH RBC QN AUTO: 27.5 PG (ref 27–31)
MCHC RBC AUTO-ENTMCNC: 33 G/DL (ref 32–36)
MCV RBC AUTO: 84 FL (ref 82–98)
MONOCYTES # BLD AUTO: 0.7 K/UL (ref 0.3–1)
MONOCYTES NFR BLD: 7.2 % (ref 4–15)
N GONORRHOEA DNA SPEC QL NAA+PROBE: NOT DETECTED
NEUTROPHILS # BLD AUTO: 7.2 K/UL (ref 1.8–7.7)
NEUTROPHILS NFR BLD: 75.4 % (ref 38–73)
NRBC BLD-RTO: 0 /100 WBC
PLATELET # BLD AUTO: 230 K/UL (ref 150–350)
PMV BLD AUTO: 9.7 FL (ref 9.2–12.9)
POTASSIUM SERPL-SCNC: 3.8 MMOL/L (ref 3.5–5.1)
RBC # BLD AUTO: 4.32 M/UL (ref 4.6–6.2)
SODIUM SERPL-SCNC: 138 MMOL/L (ref 136–145)
WBC # BLD AUTO: 9.49 K/UL (ref 3.9–12.7)

## 2020-11-29 PROCEDURE — 99231 PR SUBSEQUENT HOSPITAL CARE,LEVL I: ICD-10-PCS | Mod: ,,, | Performed by: UROLOGY

## 2020-11-29 PROCEDURE — 99231 SBSQ HOSP IP/OBS SF/LOW 25: CPT | Mod: ,,, | Performed by: UROLOGY

## 2020-11-29 PROCEDURE — 80048 BASIC METABOLIC PNL TOTAL CA: CPT

## 2020-11-29 PROCEDURE — 25000003 PHARM REV CODE 250: Performed by: INTERNAL MEDICINE

## 2020-11-29 PROCEDURE — 85025 COMPLETE CBC W/AUTO DIFF WBC: CPT

## 2020-11-29 PROCEDURE — 63600175 PHARM REV CODE 636 W HCPCS: Performed by: NURSE PRACTITIONER

## 2020-11-29 PROCEDURE — 94761 N-INVAS EAR/PLS OXIMETRY MLT: CPT

## 2020-11-29 PROCEDURE — 36415 COLL VENOUS BLD VENIPUNCTURE: CPT

## 2020-11-29 PROCEDURE — 83735 ASSAY OF MAGNESIUM: CPT

## 2020-11-29 RX ORDER — LEVOFLOXACIN 500 MG/1
500 TABLET, FILM COATED ORAL DAILY
Qty: 10 TABLET | Refills: 0 | Status: SHIPPED | OUTPATIENT
Start: 2020-11-29 | End: 2020-12-09

## 2020-11-29 RX ORDER — DOXYCYCLINE HYCLATE 100 MG
100 TABLET ORAL EVERY 12 HOURS
Qty: 20 TABLET | Refills: 0 | Status: SHIPPED | OUTPATIENT
Start: 2020-11-29 | End: 2020-11-29 | Stop reason: HOSPADM

## 2020-11-29 RX ADMIN — CEFTRIAXONE 1 G: 1 INJECTION, SOLUTION INTRAVENOUS at 02:11

## 2020-11-29 RX ADMIN — DOXYCYCLINE HYCLATE 100 MG: 100 TABLET, COATED ORAL at 08:11

## 2020-11-29 NOTE — PLAN OF CARE
LEANN met with pt at bedside to complete discharge assessment. Pt reports that he is independent with ADLs/IADLs and denies the use of DME and assistive equipment. He denies any post hospital needs and service at this time. Pt inquired about bills and payment for hospitalization post discharge. LEANN advised pt that he will receive notification regarding payments post d/c. Pt verbalized understanding of this. LEANN instructed pt to call with any questions or concerns.     D/C Plan: Home  PCP: No PCP  Preferred Pharmacy: No pharmacy listed  Discharge transportation: Friend will provide  [facility]armacy Bedside Delivery: [facility]     11/29/20 1033   Discharge Assessment   Assessment Type Discharge Planning Assessment   Confirmed/corrected address and phone number on facesheet? Yes   Assessment information obtained from? Patient   Expected Length of Stay (days) 2   Communicated expected length of stay with patient/caregiver yes   Prior to hospitilization cognitive status: Alert/Oriented   Prior to hospitalization functional status: Independent   Current cognitive status: Alert/Oriented   Current Functional Status: Independent   Lives With sibling(s);friend(s)   Able to Return to Prior Arrangements yes   Is patient able to care for self after discharge? Yes   Who are your caregiver(s) and their phone number(s)? Desire Robison (friend) [phone]   Patient's perception of discharge disposition home or selfcare   Readmission Within the Last 30 Days no previous admission in last 30 days   Patient currently being followed by outpatient case management? No   Patient currently receives any other outside agency services? No   Equipment Currently Used at Home none   Do you have any problems affording any of your prescribed medications? No   Is the patient taking medications as prescribed? yes   Does the patient have transportation home? Yes   Transportation Anticipated family or friend will provide   Does  the patient receive services at the [facility]? No   Discharge Plan A Home   Discharge Plan B Home   DME Needed Upon Discharge  none   Patient/Family in Agreement with Plan yes

## 2020-11-29 NOTE — PROGRESS NOTES
Subjective:   NAEO, no fevers since admit, pain controlled, voiding well, no hematuria     Objective:     Temp:  [96.3 °F (35.7 °C)-98.7 °F (37.1 °C)] 98.1 °F (36.7 °C)  Pulse:  [75-99] 75  Resp:  [18-19] 19  SpO2:  [98 %-99 %] 98 %  BP: (102-114)/(53-63) 105/54  I/O last 3 completed shifts:  In: 711 [P.O.:610; IV Piggyback:101]  Out: 1500 [Urine:1500]      Gen WDWN in NAD  Resp non-labored  CV Regular  Abd soft, non-distended  : BL testicles palpable, tender to palpation, firm bilaterally, no scrotal edema    Labs:   Recent Labs   Lab 11/27/20  1729 11/28/20  0639 11/29/20  0640   * 136 138   K 3.8 3.7 3.8   CL 97 102 103   CO2 26 25 25   BUN 14 12 13   CREATININE 0.8 0.7 0.7   CALCIUM 10.1 8.7 8.9     Recent Labs   Lab 11/27/20  1729 11/28/20  0639 11/29/20  0640   WBC 13.59* 17.64* 9.49   HGB 13.7* 12.3* 11.9*   HCT 41.7 38.6* 36.1*    239 230       Assessment/Plan:   Tin Mckeon is a 35 y.o. male with bilateral epididymo-orchitis.      Recommendations:  - continue antibiotics   - scrotal support  - no indication for surgical intervention at this time  - will continue to follow while admitted      Garo Shaikh MD

## 2020-11-29 NOTE — PLAN OF CARE
Problem: Adult Inpatient Plan of Care  Goal: Plan of Care Review  Outcome: Ongoing, Progressing  Flowsheets (Taken 11/29/2020 0232)  Plan of Care Reviewed With: patient  Pt had no adverse events during shift. Pt free from falls. Call light in reach. SR's x2. Pain well controlled w/ PRN meds. Pt repositions independently and with encouragement. IVF/abx administered as ordered. VTE prophylaxis - refusing interventions, ambulation encouraged, fluids promoted. Heart monitor (NS - ST. 90's - 100's). Other VSS. Bilateral scrotal swelling, frequent turning encouraged. Chart reviewed. Will continue to monitor.

## 2020-11-29 NOTE — PLAN OF CARE
Pt is ready for discharge. AVS explained. All questions and concerns addressed. Teach back done. MD at bedside to explained plan in [place]ian. IV removed.

## 2020-12-02 LAB — BACTERIA GENITAL AEROBE CULT: NORMAL

## 2020-12-03 LAB
BACTERIA BLD CULT: NORMAL
BACTERIA BLD CULT: NORMAL

## 2020-12-04 NOTE — DISCHARGE SUMMARY
[facility] Hospital Medicine  Discharge Summary      Patient Name: Tin Mckeon  MRN: [id number]  Admission Date: 11/27/2020  Hospital Length of Stay: 1 days  Discharge Date and Time: 11/29/2020 12:49 PM  Attending Physician: Natty att. providers found   Discharging Provider: Grayson Ruiz MD  Primary Care Provider: Primary Doctor Natty      HPI:   36 y/o HM with hx of nephrolithiasis, obesity to ED with c/o gradually worsening bilateral testicular pain over since the morning of presentation, associated with fever (Tmax 102.7), lower abdominal pain and bilateral lower back pain. Symptoms are persistent and of moderate severity without known exacerbating or mitigating factors. Denies chest pain, palpitations, SOB, wheezing, orthopnea, N/V/D, constipation, dysuria, hematuria, flank pain, HA, dizziness, weakness, cough, chills, falls/trauma, blurred vision, focal deficits, or recent viral illness. Found in ED to be febrile (102.7) and tachycardic (112) with WBCs 13.59, H&H 13.7&41.7, Na 134, creatinine 0.8, , lactic acid 1.4, procalcitonin 0.24, hepatitis C negative, HIV negative, flu negative, COVID-19 negative; UA uninfected, cxray non-acute; u/s of the scrotum and testicles revealed bilateral varicoceles, increased vascularity in the bilateral testicles and epididymis suggestive of orchitis and epidiymitis and mild hydrocele bilaterally with septations or complexity on the left. Pt reports only a single recent sexual partner (female) and that they use condoms for protection with every encounter. Pt was given tylenol, norco, sepsis IV fluid bolus and zosyn in ED with improvement in temperature to 98.6, and HR to 91 - still c/o significant pain. Hospital medicine was called and the pt was placed in observation on med-surg. Pt is a full code - surrogate decision maker is his friend Desire Catalan.     * No surgery found *      Hospital Course:   36 y/o latin male admitted with a dx of  "epididymo-orchitis . He was started on IV rocephin and  PO doxy .  Urology was consulted and recs  Medical tx , no surgical intervention at this time .  He report the pain  Has improved .   11/29 Pt was seen and examined at bedside . He was determined to be suitable for d/c   -Pt scrotal swelling improved with IVAB   -Urology was consulted and recs medical x and no surgical intervention  -Blood cx , Gonorrhea and chlamydia  ngtd  -He will be d/c on  Levaquin po . He took doxy  X 10 days with no improvement      Consults:   Consults (From admission, onward)        Status Ordering Provider     Inpatient consult to Urology  Once     Provider:  Garo Shaikh MD    Completed ISIDRA PARADA          * Sepsis d/t orchitis and epididymitis, bilateral    -U/s scrotum/testicles with "increased vascularity in the bilateral testicles and epididymis consistent with orchitis and epidiymitis"  -Urology on board  -Blood  cx NGTD   -Gonorrhea/chlamydia screening pending   -Continue IV rocephin for now  -Provide adequate pain control  -Tylenol prn fever    Morbid obesity  Educated on health benefits of weight loss  Advised on diet/exercise choices to improve weight loss        Final Active Diagnoses:    Diagnosis Date Noted POA    PRINCIPAL PROBLEM:  Sepsis d/t orchitis and epididymitis, bilateral [N45.3] 11/27/2020 Yes    Morbid obesity [E66.01] 11/28/2020 Yes      Problems Resolved During this Admission:       Discharged Condition: stable    Disposition: Home or Self Care    Follow Up:  Follow-up Information     [facility]. Schedule an appointment as soon as possible for a visit in 3 days.    Contact information:  [address] [facility] [address] [phone]                 Patient Instructions:      C. trachomatis/N. gonorrhoeae by AMP DNA [facility]; Urine   Standing Status: Future Standing Exp. Date: 11/28/21     Order Specific Question Answer Comments   Sources by Resulting Lab: [facility]  "   Source: Urine      Diet Adult Regular     Notify your health care provider if you experience any of the following:  temperature >100.4     Notify your health care provider if you experience any of the following:  persistent nausea and vomiting or diarrhea     Notify your health care provider if you experience any of the following:  severe uncontrolled pain     Notify your health care provider if you experience any of the following:  redness, tenderness, or signs of infection (pain, swelling, redness, odor or green/yellow discharge around incision site)     Notify your health care provider if you experience any of the following:  difficulty breathing or increased cough     Notify your health care provider if you experience any of the following:  severe persistent headache     Notify your health care provider if you experience any of the following:  worsening rash     Notify your health care provider if you experience any of the following:  persistent dizziness, light-headedness, or visual disturbances     Notify your health care provider if you experience any of the following:  increased confusion or weakness     Notify your health care provider if you experience any of the following:     Activity as tolerated       Significant Diagnostic Studies: Labs: BMP: No results for input(s): GLU, NA, K, CL, CO2, BUN, CREATININE, CALCIUM, MG in the last 48 hours., CMP No results for input(s): NA, K, CL, CO2, GLU, BUN, CREATININE, CALCIUM, PROT, ALBUMIN, BILITOT, ALKPHOS, AST, ALT, ANIONGAP, ESTGFRAFRICA, EGFRNONAA in the last 48 hours. and CBC No results for input(s): WBC, HGB, HCT, PLT in the last 48 hours.  Microbiology:   Blood Culture   Lab Results   Component Value Date    LABBLOO No growth after 5 days. 11/27/2020       Pending Diagnostic Studies:     None         Medications:  Reconciled Home Medications:      Medication List      START taking these medications    levoFLOXacin 500 MG tablet  Commonly known as:  LEVAQUIN  Take 1 tablet (500 mg total) by mouth once daily. for 10 days        CONTINUE taking these medications    naproxen 375 MG tablet  Commonly known as: NAPROSYN  Take 1 tablet (375 mg total) by mouth 2 (two) times daily with meals.     tamsulosin 0.4 mg Cap  Commonly known as: FLOMAX  Take 1 capsule (0.4 mg total) by mouth once daily.            Indwelling Lines/Drains at time of discharge:   Lines/Drains/Airways     None                 Time spent on the discharge of patient: 35 minutes  Patient was seen and examined on the date of discharge and determined to be suitable for discharge.         Grayson Ruiz MD  Department of Hospital Medicine  [facility]

## 2020-12-07 NOTE — PHYSICIAN QUERY
"PT Name: Tin Mcekon  MR #: [id number]     Documentation Clarification      CDS/: ERICK Pérez, RN, CDS               Contact information:[email or web address]     This form is a permanent document in the medical record.     Query Date: December 7, 2020    By submitting this query, we are merely seeking further clarification of documentation. Please utilize your independent clinical judgment when addressing the question(s) below.    The Medical Record reflects the following:    Clinical Findings Location in Medical Records    Sepsis d/t orchitis and epididymitis, bilateral  Febrile, tachycardic with leukocytosis (13.59)  U/s scrotum/testicles with "increased vascularity in the bilateral testicles and epididymis consistent with orchitis and epidiymitis"     ED with c/o gradually worsening bilateral testicular pain over since the morning of presentation, associated with fever (Tmax 102.7), lower abdominal pain and bilateral lower back pain. Symptoms are persistent and of moderate severity without known exacerbating or mitigating factors.      Ill-appearing     Vital Signs (24h Range):  Temp:  [98.1 °F (36.7 °C)-102.7 °F (39.3 °C)] 98.1 °F (36.7 °C)  Pulse:  [] 102  Resp:  [18-20] 18  SpO2:  [94 %-100 %] 99 %  BP: (106-149)/(54-76) 123/76     Received sepsis IV fluid bolus in ED   Continue IV rocephin for now     Admitted with a dx of epididymo-orchitis . He was started on IV rocephin and PO doxy . Urology was consulted and recs  Medical tx , no surgical intervention at this time     Vital Signs (24h Range):  Temp:  [97.2 °F (36.2 °C)-98.9 °F (37.2 °C)] 98.6 °F (37 °C)  Pulse:  [] 92  Resp:  [14-20] 18  SpO2:  [95 %-100 %] 98 %  BP: (106-149)/(54-76) 109/55      Pt was seen and examined at bedside . He was determined to be suitable for d/c   Pt scrotal swelling improved with IVAB   Blood cx, Gonorrhea and chlamydia NGTD     He will DC on levaquin po. He took doxy  X 10 days with no improvement  "       H&P, HERBERTH Huddleston, FUNMILAYO/Dr. Dhaliwal, 11/28                                             , Dr. Ruiz, 11/28                     DCS, Dr. Ruiz, 11/29 11/27 11/28 11/29   WBC 13.59 (H) 17.64 (H) 9.49     Lactate, Neel 1.4     Procalcitonin 0.24      Blood culture: NGTD     Gonococcus cx- No Neisseria gonorrhoeae isolated      Lab 11/27- 11/29       Lab 11/27     Lab 11/27     Lab 11/27     Lab 11/28                                                                                Provider, please clarify the diagnosis of Sepsis:      [  x ] Sepsis is confirmed and additional clinical support/decision-making indicators for the diagnosis include (please specify):________________     [   ] Sepsis is not confirmed and/or it has been ruled out     [   ] Other clarification (please specify): ___________________     [  ] Clinically undetermined